# Patient Record
Sex: MALE | Race: WHITE | NOT HISPANIC OR LATINO | Employment: FULL TIME | URBAN - METROPOLITAN AREA
[De-identification: names, ages, dates, MRNs, and addresses within clinical notes are randomized per-mention and may not be internally consistent; named-entity substitution may affect disease eponyms.]

---

## 2017-01-26 ENCOUNTER — RESULTS ENCOUNTER (OUTPATIENT)
Dept: FAMILY MEDICINE CLINIC | Facility: CLINIC | Age: 52
End: 2017-01-26

## 2017-01-26 DIAGNOSIS — E03.9 HYPOTHYROIDISM: ICD-10-CM

## 2017-01-26 DIAGNOSIS — I10 ESSENTIAL HYPERTENSION: ICD-10-CM

## 2017-01-26 DIAGNOSIS — Z12.5 SCREENING FOR PROSTATE CANCER: ICD-10-CM

## 2017-01-26 DIAGNOSIS — E78.5 HYPERLIPIDEMIA: ICD-10-CM

## 2017-02-21 DIAGNOSIS — I10 ESSENTIAL HYPERTENSION: ICD-10-CM

## 2017-02-21 DIAGNOSIS — E03.9 HYPOTHYROIDISM: ICD-10-CM

## 2017-02-21 DIAGNOSIS — E78.5 HYPERLIPIDEMIA: ICD-10-CM

## 2017-02-21 RX ORDER — ATORVASTATIN CALCIUM 20 MG/1
TABLET, FILM COATED ORAL
Qty: 90 TABLET | Refills: 0 | OUTPATIENT
Start: 2017-02-21

## 2017-02-21 RX ORDER — LEVOTHYROXINE SODIUM 0.03 MG/1
TABLET ORAL
Qty: 90 TABLET | Refills: 0 | OUTPATIENT
Start: 2017-02-21

## 2017-02-21 RX ORDER — METOPROLOL SUCCINATE 50 MG/1
TABLET, EXTENDED RELEASE ORAL
Qty: 90 TABLET | Refills: 0 | OUTPATIENT
Start: 2017-02-21

## 2017-02-21 RX ORDER — RAMIPRIL 5 MG/1
CAPSULE ORAL
Qty: 90 CAPSULE | Refills: 0 | OUTPATIENT
Start: 2017-02-21

## 2017-04-06 LAB
ALBUMIN SERPL-MCNC: 4.1 G/DL (ref 3.5–5.2)
ALBUMIN/GLOB SERPL: 1.5 G/DL
ALP SERPL-CCNC: 88 U/L (ref 39–117)
ALT SERPL-CCNC: 32 U/L (ref 1–41)
APPEARANCE UR: CLEAR
AST SERPL-CCNC: 19 U/L (ref 1–40)
BACTERIA #/AREA URNS HPF: ABNORMAL /HPF
BASOPHILS # BLD AUTO: 0.02 10*3/MM3 (ref 0–0.2)
BASOPHILS NFR BLD AUTO: 0.4 % (ref 0–1.5)
BILIRUB SERPL-MCNC: 0.4 MG/DL (ref 0.1–1.2)
BILIRUB UR QL STRIP: NEGATIVE
BUN SERPL-MCNC: 7 MG/DL (ref 6–20)
BUN/CREAT SERPL: 7.4 (ref 7–25)
CALCIUM SERPL-MCNC: 9.3 MG/DL (ref 8.6–10.5)
CASTS URNS MICRO: ABNORMAL
CHLORIDE SERPL-SCNC: 101 MMOL/L (ref 98–107)
CHOLEST SERPL-MCNC: 197 MG/DL (ref 0–200)
CO2 SERPL-SCNC: 22.7 MMOL/L (ref 22–29)
COLOR UR: (no result)
CREAT SERPL-MCNC: 0.94 MG/DL (ref 0.76–1.27)
EOSINOPHIL # BLD AUTO: 0.07 10*3/MM3 (ref 0–0.7)
EOSINOPHIL NFR BLD AUTO: 1.3 % (ref 0.3–6.2)
EPI CELLS #/AREA URNS HPF: ABNORMAL /HPF
ERYTHROCYTE [DISTWIDTH] IN BLOOD BY AUTOMATED COUNT: 14.7 % (ref 11.5–14.5)
GLOBULIN SER CALC-MCNC: 2.8 GM/DL
GLUCOSE SERPL-MCNC: 80 MG/DL (ref 65–99)
GLUCOSE UR QL: NEGATIVE
HCT VFR BLD AUTO: 43.7 % (ref 40.4–52.2)
HDLC SERPL-MCNC: 43 MG/DL (ref 40–60)
HGB BLD-MCNC: 15.2 G/DL (ref 13.7–17.6)
HGB UR QL STRIP: NEGATIVE
IMM GRANULOCYTES # BLD: 0 10*3/MM3 (ref 0–0.03)
IMM GRANULOCYTES NFR BLD: 0 % (ref 0–0.5)
KETONES UR QL STRIP: NEGATIVE
LDLC SERPL CALC-MCNC: 134 MG/DL (ref 0–100)
LEUKOCYTE ESTERASE UR QL STRIP: NEGATIVE
LYMPHOCYTES # BLD AUTO: 1.81 10*3/MM3 (ref 0.9–4.8)
LYMPHOCYTES NFR BLD AUTO: 33.6 % (ref 19.6–45.3)
MCH RBC QN AUTO: 30.4 PG (ref 27–32.7)
MCHC RBC AUTO-ENTMCNC: 34.8 G/DL (ref 32.6–36.4)
MCV RBC AUTO: 87.4 FL (ref 79.8–96.2)
MONOCYTES # BLD AUTO: 0.42 10*3/MM3 (ref 0.2–1.2)
MONOCYTES NFR BLD AUTO: 7.8 % (ref 5–12)
MUCOUS THREADS URNS QL MICRO: ABNORMAL /HPF
NEUTROPHILS # BLD AUTO: 3.06 10*3/MM3 (ref 1.9–8.1)
NEUTROPHILS NFR BLD AUTO: 56.9 % (ref 42.7–76)
NITRITE UR QL STRIP: NEGATIVE
PH UR STRIP: 6 [PH] (ref 5–8)
PLATELET # BLD AUTO: 225 10*3/MM3 (ref 140–500)
POTASSIUM SERPL-SCNC: 3.8 MMOL/L (ref 3.5–5.2)
PROT SERPL-MCNC: 6.9 G/DL (ref 6–8.5)
PROT UR QL STRIP: NEGATIVE
PSA SERPL-MCNC: 0.66 NG/ML (ref 0–4)
RBC # BLD AUTO: 5 10*6/MM3 (ref 4.6–6)
RBC #/AREA URNS HPF: ABNORMAL /HPF
SODIUM SERPL-SCNC: 141 MMOL/L (ref 136–145)
SP GR UR: 1.02 (ref 1–1.03)
TRIGL SERPL-MCNC: 99 MG/DL (ref 0–150)
TSH SERPL DL<=0.005 MIU/L-ACNC: 2.03 MIU/ML (ref 0.27–4.2)
UROBILINOGEN UR STRIP-MCNC: (no result) MG/DL
VLDLC SERPL CALC-MCNC: 19.8 MG/DL (ref 5–40)
WBC # BLD AUTO: 5.38 10*3/MM3 (ref 4.5–10.7)
WBC #/AREA URNS HPF: ABNORMAL /HPF

## 2017-04-07 ENCOUNTER — OFFICE VISIT (OUTPATIENT)
Dept: FAMILY MEDICINE CLINIC | Facility: CLINIC | Age: 52
End: 2017-04-07

## 2017-04-07 VITALS
SYSTOLIC BLOOD PRESSURE: 120 MMHG | TEMPERATURE: 97.3 F | BODY MASS INDEX: 39.8 KG/M2 | DIASTOLIC BLOOD PRESSURE: 76 MMHG | RESPIRATION RATE: 16 BRPM | WEIGHT: 278 LBS | HEIGHT: 70 IN | HEART RATE: 115 BPM | OXYGEN SATURATION: 100 %

## 2017-04-07 DIAGNOSIS — N52.9 ERECTILE DYSFUNCTION, UNSPECIFIED ERECTILE DYSFUNCTION TYPE: ICD-10-CM

## 2017-04-07 DIAGNOSIS — I10 ESSENTIAL HYPERTENSION: ICD-10-CM

## 2017-04-07 DIAGNOSIS — E03.9 HYPOTHYROIDISM, UNSPECIFIED TYPE: ICD-10-CM

## 2017-04-07 DIAGNOSIS — E78.2 MIXED HYPERLIPIDEMIA: ICD-10-CM

## 2017-04-07 DIAGNOSIS — Z00.00 ANNUAL PHYSICAL EXAM: Primary | ICD-10-CM

## 2017-04-07 PROCEDURE — 93000 ELECTROCARDIOGRAM COMPLETE: CPT | Performed by: FAMILY MEDICINE

## 2017-04-07 PROCEDURE — 99396 PREV VISIT EST AGE 40-64: CPT | Performed by: FAMILY MEDICINE

## 2017-04-07 PROCEDURE — 71020 XR CHEST PA AND LATERAL: CPT | Performed by: FAMILY MEDICINE

## 2017-04-07 RX ORDER — ATORVASTATIN CALCIUM 20 MG/1
20 TABLET, FILM COATED ORAL DAILY
Qty: 90 TABLET | Refills: 1 | Status: SHIPPED | OUTPATIENT
Start: 2017-04-07 | End: 2017-10-13 | Stop reason: SDUPTHER

## 2017-04-07 RX ORDER — LEVOTHYROXINE SODIUM 0.03 MG/1
25 TABLET ORAL DAILY
Qty: 90 TABLET | Refills: 1 | Status: SHIPPED | OUTPATIENT
Start: 2017-04-07 | End: 2017-10-13 | Stop reason: SDUPTHER

## 2017-04-07 RX ORDER — SILDENAFIL CITRATE 100 MG
100 TABLET ORAL AS NEEDED
Qty: 6 TABLET | Refills: 5 | Status: SHIPPED | OUTPATIENT
Start: 2017-04-07 | End: 2018-06-25 | Stop reason: SDUPTHER

## 2017-04-07 RX ORDER — METOPROLOL SUCCINATE 50 MG/1
50 TABLET, EXTENDED RELEASE ORAL DAILY
Qty: 90 TABLET | Refills: 1 | Status: SHIPPED | OUTPATIENT
Start: 2017-04-07 | End: 2017-10-13 | Stop reason: SDUPTHER

## 2017-04-07 RX ORDER — ASPIRIN 325 MG
325 TABLET ORAL DAILY
COMMUNITY

## 2017-04-07 RX ORDER — DESVENLAFAXINE 100 MG/1
200 TABLET, EXTENDED RELEASE ORAL DAILY
COMMUNITY
Start: 2017-03-18

## 2017-04-07 RX ORDER — ARIPIPRAZOLE 30 MG/1
30 TABLET ORAL
COMMUNITY
Start: 2017-03-18

## 2017-04-07 RX ORDER — RAMIPRIL 5 MG/1
5 CAPSULE ORAL DAILY
Qty: 90 CAPSULE | Refills: 1 | Status: SHIPPED | OUTPATIENT
Start: 2017-04-07 | End: 2017-10-13 | Stop reason: SDUPTHER

## 2017-04-07 RX ORDER — GABAPENTIN 300 MG/1
300 CAPSULE ORAL DAILY
Qty: 90 CAPSULE | Refills: 1 | Status: SHIPPED | OUTPATIENT
Start: 2017-04-07 | End: 2018-04-13

## 2017-04-07 NOTE — PROGRESS NOTES
Procedure     ECG 12 Lead  Date/Time: 4/7/2017 11:37 AM  Performed by: LUIS ANTONIO LEWIS  Authorized by: LUIS ANTONIO LEWIS   Comparison: compared with previous ECG from 3/10/2016  Similar to previous ECG  Rhythm: sinus rhythm  Rate: normal  BPM: 111  ST Segments: ST segments normal  T Waves: T waves normal  QRS axis: normal  Other: no other findings  Clinical impression: normal ECG

## 2017-09-18 RX ORDER — GABAPENTIN 300 MG/1
CAPSULE ORAL
Qty: 90 CAPSULE | Refills: 1 | OUTPATIENT
Start: 2017-09-18

## 2017-10-04 DIAGNOSIS — E03.9 HYPOTHYROIDISM, UNSPECIFIED TYPE: ICD-10-CM

## 2017-10-04 DIAGNOSIS — E78.2 MIXED HYPERLIPIDEMIA: ICD-10-CM

## 2017-10-04 DIAGNOSIS — I10 ESSENTIAL HYPERTENSION: ICD-10-CM

## 2017-10-04 RX ORDER — METOPROLOL SUCCINATE 50 MG/1
TABLET, EXTENDED RELEASE ORAL
Qty: 90 TABLET | Refills: 1 | OUTPATIENT
Start: 2017-10-04

## 2017-10-04 RX ORDER — ATORVASTATIN CALCIUM 20 MG/1
TABLET, FILM COATED ORAL
Qty: 90 TABLET | Refills: 1 | OUTPATIENT
Start: 2017-10-04

## 2017-10-04 RX ORDER — LEVOTHYROXINE SODIUM 0.03 MG/1
TABLET ORAL
Qty: 90 TABLET | Refills: 1 | OUTPATIENT
Start: 2017-10-04

## 2017-10-04 RX ORDER — RAMIPRIL 5 MG/1
CAPSULE ORAL
Qty: 90 CAPSULE | Refills: 1 | OUTPATIENT
Start: 2017-10-04

## 2017-10-12 LAB
ALBUMIN SERPL-MCNC: 4.4 G/DL (ref 3.5–5.2)
ALBUMIN/GLOB SERPL: 1.5 G/DL
ALP SERPL-CCNC: 92 U/L (ref 39–117)
ALT SERPL-CCNC: 27 U/L (ref 1–41)
APPEARANCE UR: CLEAR
AST SERPL-CCNC: 20 U/L (ref 1–40)
BACTERIA #/AREA URNS HPF: NORMAL /HPF
BASOPHILS # BLD AUTO: 0.01 10*3/MM3 (ref 0–0.2)
BASOPHILS NFR BLD AUTO: 0.1 % (ref 0–1.5)
BILIRUB SERPL-MCNC: 0.6 MG/DL (ref 0.1–1.2)
BILIRUB UR QL STRIP: NEGATIVE
BUN SERPL-MCNC: 10 MG/DL (ref 6–20)
BUN/CREAT SERPL: 9.4 (ref 7–25)
CALCIUM SERPL-MCNC: 9.3 MG/DL (ref 8.6–10.5)
CASTS URNS MICRO: NORMAL
CHLORIDE SERPL-SCNC: 99 MMOL/L (ref 98–107)
CHOLEST SERPL-MCNC: 128 MG/DL (ref 0–200)
CO2 SERPL-SCNC: 25.3 MMOL/L (ref 22–29)
COLOR UR: YELLOW
CREAT SERPL-MCNC: 1.06 MG/DL (ref 0.76–1.27)
EOSINOPHIL # BLD AUTO: 0.15 10*3/MM3 (ref 0–0.7)
EOSINOPHIL NFR BLD AUTO: 2.2 % (ref 0.3–6.2)
EPI CELLS #/AREA URNS HPF: NORMAL /HPF
ERYTHROCYTE [DISTWIDTH] IN BLOOD BY AUTOMATED COUNT: 14.5 % (ref 11.5–14.5)
GLOBULIN SER CALC-MCNC: 2.9 GM/DL
GLUCOSE SERPL-MCNC: 81 MG/DL (ref 65–99)
GLUCOSE UR QL: NEGATIVE
HCT VFR BLD AUTO: 43.5 % (ref 40.4–52.2)
HDLC SERPL-MCNC: 40 MG/DL (ref 40–60)
HGB BLD-MCNC: 14.8 G/DL (ref 13.7–17.6)
HGB UR QL STRIP: NEGATIVE
IMM GRANULOCYTES # BLD: 0.02 10*3/MM3 (ref 0–0.03)
IMM GRANULOCYTES NFR BLD: 0.3 % (ref 0–0.5)
KETONES UR QL STRIP: NEGATIVE
LDLC SERPL CALC-MCNC: 71 MG/DL (ref 0–100)
LEUKOCYTE ESTERASE UR QL STRIP: NEGATIVE
LYMPHOCYTES # BLD AUTO: 2.36 10*3/MM3 (ref 0.9–4.8)
LYMPHOCYTES NFR BLD AUTO: 34.7 % (ref 19.6–45.3)
MCH RBC QN AUTO: 29.9 PG (ref 27–32.7)
MCHC RBC AUTO-ENTMCNC: 34 G/DL (ref 32.6–36.4)
MCV RBC AUTO: 87.9 FL (ref 79.8–96.2)
MONOCYTES # BLD AUTO: 0.52 10*3/MM3 (ref 0.2–1.2)
MONOCYTES NFR BLD AUTO: 7.6 % (ref 5–12)
NEUTROPHILS # BLD AUTO: 3.74 10*3/MM3 (ref 1.9–8.1)
NEUTROPHILS NFR BLD AUTO: 55.1 % (ref 42.7–76)
NITRITE UR QL STRIP: NEGATIVE
PH UR STRIP: 6.5 [PH] (ref 5–8)
PLATELET # BLD AUTO: 248 10*3/MM3 (ref 140–500)
POTASSIUM SERPL-SCNC: 4.2 MMOL/L (ref 3.5–5.2)
PROT SERPL-MCNC: 7.3 G/DL (ref 6–8.5)
PROT UR QL STRIP: NEGATIVE
RBC # BLD AUTO: 4.95 10*6/MM3 (ref 4.6–6)
RBC #/AREA URNS HPF: NORMAL /HPF
SODIUM SERPL-SCNC: 139 MMOL/L (ref 136–145)
SP GR UR: 1.02 (ref 1–1.03)
TRIGL SERPL-MCNC: 83 MG/DL (ref 0–150)
UROBILINOGEN UR STRIP-MCNC: (no result) MG/DL
VLDLC SERPL CALC-MCNC: 16.6 MG/DL (ref 5–40)
WBC # BLD AUTO: 6.8 10*3/MM3 (ref 4.5–10.7)
WBC #/AREA URNS HPF: NORMAL /HPF

## 2017-10-13 ENCOUNTER — OFFICE VISIT (OUTPATIENT)
Dept: FAMILY MEDICINE CLINIC | Facility: CLINIC | Age: 52
End: 2017-10-13

## 2017-10-13 VITALS
WEIGHT: 272 LBS | SYSTOLIC BLOOD PRESSURE: 118 MMHG | OXYGEN SATURATION: 100 % | DIASTOLIC BLOOD PRESSURE: 72 MMHG | HEART RATE: 72 BPM | TEMPERATURE: 98.5 F | BODY MASS INDEX: 38.94 KG/M2 | RESPIRATION RATE: 16 BRPM | HEIGHT: 70 IN

## 2017-10-13 DIAGNOSIS — I10 ESSENTIAL HYPERTENSION: ICD-10-CM

## 2017-10-13 DIAGNOSIS — E03.9 HYPOTHYROIDISM, UNSPECIFIED TYPE: ICD-10-CM

## 2017-10-13 DIAGNOSIS — E78.2 MIXED HYPERLIPIDEMIA: ICD-10-CM

## 2017-10-13 PROCEDURE — 99214 OFFICE O/P EST MOD 30 MIN: CPT | Performed by: FAMILY MEDICINE

## 2017-10-13 RX ORDER — RAMIPRIL 5 MG/1
5 CAPSULE ORAL DAILY
Qty: 90 CAPSULE | Refills: 1 | Status: SHIPPED | OUTPATIENT
Start: 2017-10-13 | End: 2018-04-13 | Stop reason: SDUPTHER

## 2017-10-13 RX ORDER — ATORVASTATIN CALCIUM 20 MG/1
20 TABLET, FILM COATED ORAL DAILY
Qty: 90 TABLET | Refills: 1 | Status: SHIPPED | OUTPATIENT
Start: 2017-10-13 | End: 2018-04-13 | Stop reason: SDUPTHER

## 2017-10-13 RX ORDER — METOPROLOL SUCCINATE 50 MG/1
50 TABLET, EXTENDED RELEASE ORAL DAILY
Qty: 90 TABLET | Refills: 1 | Status: SHIPPED | OUTPATIENT
Start: 2017-10-13 | End: 2018-04-13 | Stop reason: SDUPTHER

## 2017-10-13 RX ORDER — LEVOTHYROXINE SODIUM 0.03 MG/1
25 TABLET ORAL DAILY
Qty: 90 TABLET | Refills: 1 | Status: SHIPPED | OUTPATIENT
Start: 2017-10-13 | End: 2018-04-13 | Stop reason: SDUPTHER

## 2017-10-13 NOTE — PROGRESS NOTES
Subjective   Ricardo Tomlinson is a 51 y.o. male.     History of Present Illness     The following portions of the patient's history were reviewed and updated as appropriate: allergies, current medications, past family history, past medical history, past social history, past surgical history and problem list.    Review of Systems   Constitutional: Negative for activity change, appetite change and unexpected weight change.   Eyes: Negative for visual disturbance.   Respiratory: Negative for chest tightness and shortness of breath.    Cardiovascular: Negative for chest pain and palpitations.   Skin: Negative for color change.   Neurological: Negative for syncope and speech difficulty.   Psychiatric/Behavioral: Negative for confusion and decreased concentration.       Objective   Physical Exam   Constitutional: He is oriented to person, place, and time. He appears well-developed and well-nourished.   HENT:   Head: Atraumatic.   Mouth/Throat: Oropharynx is clear and moist.   Eyes: EOM are normal. Pupils are equal, round, and reactive to light.   Neck: Normal range of motion. Neck supple. No thyromegaly present.   Cardiovascular: Normal rate and regular rhythm.    No murmur heard.  Pulmonary/Chest: Effort normal and breath sounds normal.   Abdominal: Soft.   Musculoskeletal: Normal range of motion.   Neurological: He is alert and oriented to person, place, and time.   Skin: Skin is warm and dry.   Psychiatric: He has a normal mood and affect. His behavior is normal.   Nursing note and vitals reviewed.      Assessment/Plan   Ricardo was seen today for hypertension and follow-up.    Diagnoses and all orders for this visit:    Mixed hyperlipidemia  -     atorvastatin (LIPITOR) 20 MG tablet; Take 1 tablet by mouth Daily.  -     Comprehensive metabolic panel; Future  -     Lipid panel; Future  -     CBC and Differential; Future  -     TSH; Future  -     PSA; Future    Essential hypertension  -     ramipril (ALTACE) 5 MG  capsule; Take 1 capsule by mouth Daily.  -     metoprolol succinate XL (TOPROL-XL) 50 MG 24 hr tablet; Take 1 tablet by mouth Daily.  -     Comprehensive metabolic panel; Future  -     Lipid panel; Future  -     CBC and Differential; Future  -     TSH; Future  -     PSA; Future    Hypothyroidism, unspecified type  -     levothyroxine (SYNTHROID, LEVOTHROID) 25 MCG tablet; Take 1 tablet by mouth Daily.  -     Comprehensive metabolic panel; Future  -     Lipid panel; Future  -     CBC and Differential; Future  -     TSH; Future  -     PSA; Future                Chief Complaint:   Chief Complaint   Patient presents with   • Hypertension   • Follow-up     6 month       Ricardo Tomlinson 51 y.o. male who presents today for Medical Management of the below listed issues and medication refills. Reviewed labs, refill meds.  he has a problem list of   Patient Active Problem List   Diagnosis   • Hyperlipidemia   • Essential hypertension   • ADHD (attention deficit hyperactivity disorder)   • Depression   • CAD (coronary artery disease)   • Nicotine dependence   • Hypothyroidism   • Erectile dysfunction   .  Since the last visit, he has overall felt well.  he has been compliant with   Current Outpatient Prescriptions:   •  amphetamine-dextroamphetamine (ADDERALL) 30 MG tablet, Take 30 mg by mouth daily., Disp: , Rfl:   •  amphetamine-dextroamphetamine XR (ADDERALL XR) 30 MG 24 hr capsule, Take 30 mg by mouth 2 (two) times a day., Disp: , Rfl:   •  ARIPiprazole (ABILIFY) 30 MG tablet, Take 30 mg by mouth every night at bedtime., Disp: , Rfl:   •  aspirin 325 MG tablet, Take 325 mg by mouth Daily., Disp: , Rfl:   •  atorvastatin (LIPITOR) 20 MG tablet, Take 1 tablet by mouth Daily., Disp: 90 tablet, Rfl: 1  •  desvenlafaxine (PRISTIQ) 100 MG 24 hr tablet, Take 200 mg by mouth Daily., Disp: , Rfl:   •  gabapentin (NEURONTIN) 300 MG capsule, Take 1 capsule by mouth Daily., Disp: 90 capsule, Rfl: 1  •  levothyroxine (SYNTHROID,  "LEVOTHROID) 25 MCG tablet, Take 1 tablet by mouth Daily., Disp: 90 tablet, Rfl: 1  •  metoprolol succinate XL (TOPROL-XL) 50 MG 24 hr tablet, Take 1 tablet by mouth Daily., Disp: 90 tablet, Rfl: 1  •  ramipril (ALTACE) 5 MG capsule, Take 1 capsule by mouth Daily., Disp: 90 capsule, Rfl: 1  •  VIAGRA 100 MG tablet, Take 1 tablet by mouth As Needed (ED)., Disp: 6 tablet, Rfl: 5.  he denies medication side effects.    All of the chronic condition(s) listed above are stable w/o issues.    /72 (BP Location: Left arm, Patient Position: Sitting, Cuff Size: Large Adult)  Pulse 72  Temp 98.5 °F (36.9 °C) (Oral)   Resp 16  Ht 70\" (177.8 cm)  Wt 272 lb (123 kg)  SpO2 100%  BMI 39.03 kg/m2    Results for orders placed or performed in visit on 10/11/17   Comprehensive Metabolic Panel   Result Value Ref Range    Glucose 81 65 - 99 mg/dL    BUN 10 6 - 20 mg/dL    Creatinine 1.06 0.76 - 1.27 mg/dL    eGFR Non African Am 73 >60 mL/min/1.73    eGFR African Am 89 >60 mL/min/1.73    BUN/Creatinine Ratio 9.4 7.0 - 25.0    Sodium 139 136 - 145 mmol/L    Potassium 4.2 3.5 - 5.2 mmol/L    Chloride 99 98 - 107 mmol/L    Total CO2 25.3 22.0 - 29.0 mmol/L    Calcium 9.3 8.6 - 10.5 mg/dL    Total Protein 7.3 6.0 - 8.5 g/dL    Albumin 4.40 3.50 - 5.20 g/dL    Globulin 2.9 gm/dL    A/G Ratio 1.5 g/dL    Total Bilirubin 0.6 0.1 - 1.2 mg/dL    Alkaline Phosphatase 92 39 - 117 U/L    AST (SGOT) 20 1 - 40 U/L    ALT (SGPT) 27 1 - 41 U/L   Microscopic Examination   Result Value Ref Range    WBC, UA 0-2 /hpf    RBC, UA 0-2 /hpf    Epithelial Cells (non renal) 0-2 /hpf    Cast Type Comment     Bacteria, UA Comment None Seen /hpf   Lipid Panel   Result Value Ref Range    Total Cholesterol 128 0 - 200 mg/dL    Triglycerides 83 0 - 150 mg/dL    HDL Cholesterol 40 40 - 60 mg/dL    VLDL Cholesterol 16.6 5 - 40 mg/dL    LDL Cholesterol  71 0 - 100 mg/dL   CBC & Differential   Result Value Ref Range    WBC 6.80 4.50 - 10.70 10*3/mm3    RBC 4.95 " 4.60 - 6.00 10*6/mm3    Hemoglobin 14.8 13.7 - 17.6 g/dL    Hematocrit 43.5 40.4 - 52.2 %    MCV 87.9 79.8 - 96.2 fL    MCH 29.9 27.0 - 32.7 pg    MCHC 34.0 32.6 - 36.4 g/dL    RDW 14.5 11.5 - 14.5 %    Platelets 248 140 - 500 10*3/mm3    Neutrophil Rel % 55.1 42.7 - 76.0 %    Lymphocyte Rel % 34.7 19.6 - 45.3 %    Monocyte Rel % 7.6 5.0 - 12.0 %    Eosinophil Rel % 2.2 0.3 - 6.2 %    Basophil Rel % 0.1 0.0 - 1.5 %    Neutrophils Absolute 3.74 1.90 - 8.10 10*3/mm3    Lymphocytes Absolute 2.36 0.90 - 4.80 10*3/mm3    Monocytes Absolute 0.52 0.20 - 1.20 10*3/mm3    Eosinophils Absolute 0.15 0.00 - 0.70 10*3/mm3    Basophils Absolute 0.01 0.00 - 0.20 10*3/mm3    Immature Granulocyte Rel % 0.3 0.0 - 0.5 %    Immature Grans Absolute 0.02 0.00 - 0.03 10*3/mm3   Urinalysis With Microscopic   Result Value Ref Range    Specific Gravity, UA 1.018 1.005 - 1.030    pH, UA 6.5 5.0 - 8.0    Color, UA Yellow     Appearance, UA Clear Clear    Leukocytes, UA Negative Negative    Protein Negative Negative    Glucose, UA Negative Negative    Ketones Negative Negative    Blood, UA Negative Negative    Bilirubin, UA Negative Negative    Urobilinogen, UA Comment     Nitrite, UA Negative Negative

## 2018-03-02 DIAGNOSIS — Z12.11 COLON CANCER SCREENING: Primary | ICD-10-CM

## 2018-03-02 RX ORDER — SODIUM CHLORIDE, SODIUM LACTATE, POTASSIUM CHLORIDE, CALCIUM CHLORIDE 600; 310; 30; 20 MG/100ML; MG/100ML; MG/100ML; MG/100ML
30 INJECTION, SOLUTION INTRAVENOUS CONTINUOUS
Status: CANCELLED | OUTPATIENT
Start: 2018-04-09

## 2018-03-13 ENCOUNTER — RESULTS ENCOUNTER (OUTPATIENT)
Dept: FAMILY MEDICINE CLINIC | Facility: CLINIC | Age: 53
End: 2018-03-13

## 2018-03-13 DIAGNOSIS — I10 ESSENTIAL HYPERTENSION: ICD-10-CM

## 2018-03-13 DIAGNOSIS — E78.2 MIXED HYPERLIPIDEMIA: ICD-10-CM

## 2018-03-13 DIAGNOSIS — E03.9 HYPOTHYROIDISM, UNSPECIFIED TYPE: ICD-10-CM

## 2018-03-16 PROBLEM — Z12.11 COLON CANCER SCREENING: Status: ACTIVE | Noted: 2018-03-16

## 2018-04-09 ENCOUNTER — ANESTHESIA EVENT (OUTPATIENT)
Dept: GASTROENTEROLOGY | Facility: HOSPITAL | Age: 53
End: 2018-04-09

## 2018-04-09 ENCOUNTER — ANESTHESIA (OUTPATIENT)
Dept: GASTROENTEROLOGY | Facility: HOSPITAL | Age: 53
End: 2018-04-09

## 2018-04-09 ENCOUNTER — HOSPITAL ENCOUNTER (OUTPATIENT)
Facility: HOSPITAL | Age: 53
Setting detail: HOSPITAL OUTPATIENT SURGERY
Discharge: HOME | End: 2018-04-09
Attending: INTERNAL MEDICINE | Admitting: INTERNAL MEDICINE

## 2018-04-09 VITALS
OXYGEN SATURATION: 97 % | TEMPERATURE: 97.9 F | HEIGHT: 70 IN | RESPIRATION RATE: 18 BRPM | SYSTOLIC BLOOD PRESSURE: 122 MMHG | WEIGHT: 266 LBS | HEART RATE: 75 BPM | DIASTOLIC BLOOD PRESSURE: 93 MMHG | BODY MASS INDEX: 38.08 KG/M2

## 2018-04-09 DIAGNOSIS — Z12.11 COLON CANCER SCREENING: ICD-10-CM

## 2018-04-09 PROCEDURE — 25010000002 PROPOFOL 10 MG/ML EMULSION: Performed by: NURSE ANESTHETIST, CERTIFIED REGISTERED

## 2018-04-09 PROCEDURE — 45381 COLONOSCOPY SUBMUCOUS NJX: CPT | Performed by: INTERNAL MEDICINE

## 2018-04-09 PROCEDURE — 45385 COLONOSCOPY W/LESION REMOVAL: CPT | Performed by: INTERNAL MEDICINE

## 2018-04-09 PROCEDURE — 88305 TISSUE EXAM BY PATHOLOGIST: CPT | Performed by: INTERNAL MEDICINE

## 2018-04-09 PROCEDURE — S0260 H&P FOR SURGERY: HCPCS | Performed by: INTERNAL MEDICINE

## 2018-04-09 PROCEDURE — 25010000002 PHENYLEPHRINE PER 1 ML: Performed by: NURSE ANESTHETIST, CERTIFIED REGISTERED

## 2018-04-09 DEVICE — DEV CLIP ENDO RESOLUTION360 CONTRL ROT 235CM: Type: IMPLANTABLE DEVICE | Site: TRANSVERSE COLON | Status: FUNCTIONAL

## 2018-04-09 RX ORDER — SODIUM CHLORIDE 0.9 % (FLUSH) 0.9 %
1-10 SYRINGE (ML) INJECTION AS NEEDED
Status: DISCONTINUED | OUTPATIENT
Start: 2018-04-09 | End: 2018-04-09 | Stop reason: HOSPADM

## 2018-04-09 RX ORDER — SODIUM CHLORIDE, SODIUM LACTATE, POTASSIUM CHLORIDE, CALCIUM CHLORIDE 600; 310; 30; 20 MG/100ML; MG/100ML; MG/100ML; MG/100ML
30 INJECTION, SOLUTION INTRAVENOUS CONTINUOUS
Status: DISCONTINUED | OUTPATIENT
Start: 2018-04-09 | End: 2018-04-09

## 2018-04-09 RX ORDER — SODIUM CHLORIDE, SODIUM LACTATE, POTASSIUM CHLORIDE, CALCIUM CHLORIDE 600; 310; 30; 20 MG/100ML; MG/100ML; MG/100ML; MG/100ML
30 INJECTION, SOLUTION INTRAVENOUS CONTINUOUS PRN
Status: DISCONTINUED | OUTPATIENT
Start: 2018-04-09 | End: 2018-04-09 | Stop reason: HOSPADM

## 2018-04-09 RX ORDER — PROPOFOL 10 MG/ML
VIAL (ML) INTRAVENOUS CONTINUOUS PRN
Status: DISCONTINUED | OUTPATIENT
Start: 2018-04-09 | End: 2018-04-09 | Stop reason: SURG

## 2018-04-09 RX ORDER — LIDOCAINE HYDROCHLORIDE 20 MG/ML
INJECTION, SOLUTION INFILTRATION; PERINEURAL AS NEEDED
Status: DISCONTINUED | OUTPATIENT
Start: 2018-04-09 | End: 2018-04-09 | Stop reason: SURG

## 2018-04-09 RX ORDER — PROPOFOL 10 MG/ML
VIAL (ML) INTRAVENOUS AS NEEDED
Status: DISCONTINUED | OUTPATIENT
Start: 2018-04-09 | End: 2018-04-09 | Stop reason: SURG

## 2018-04-09 RX ADMIN — PHENYLEPHRINE HYDROCHLORIDE 100 MCG: 10 INJECTION INTRAVENOUS at 14:21

## 2018-04-09 RX ADMIN — SODIUM CHLORIDE, POTASSIUM CHLORIDE, SODIUM LACTATE AND CALCIUM CHLORIDE 30 ML/HR: 600; 310; 30; 20 INJECTION, SOLUTION INTRAVENOUS at 12:40

## 2018-04-09 RX ADMIN — PROPOFOL 100 MG: 10 INJECTION, EMULSION INTRAVENOUS at 13:08

## 2018-04-09 RX ADMIN — PROPOFOL 160 MCG/KG/MIN: 10 INJECTION, EMULSION INTRAVENOUS at 13:08

## 2018-04-09 RX ADMIN — LIDOCAINE HYDROCHLORIDE 80 MG: 20 INJECTION, SOLUTION INFILTRATION; PERINEURAL at 13:07

## 2018-04-09 NOTE — H&P
[facility]  Pre Procedure History & Physical    Chief Complaint:  Colon cancer screening, family history of colon cancer      HPI: 53yo M with PMH as below here for screening colonoscopy.  Reports his mother had colon cancer at age 76.  Denies blood in stool, change in bowel habits, abdominal pain.  Otherwise feels well.        Past Medical History:   Past Medical History:   Diagnosis Date   • ADHD (attention deficit hyperactivity disorder)    • CAD (coronary artery disease)    • Depression    • H/O complete eye exam due   • Hyperlipidemia    • Hypertension    • Tobacco abuse        Family History:  Family History   Problem Relation Age of Onset   • Colon cancer Mother    • Hypertension Father    • Heart disease Father    • Alcohol abuse Sister        Social History:   reports that he has quit smoking. He has never used smokeless tobacco. He reports that he drinks alcohol. He reports that he does not use drugs.    Medications:   Prescriptions Prior to Admission   Medication Sig Dispense Refill Last Dose   • amphetamine-dextroamphetamine (ADDERALL) 30 MG tablet Take 30 mg by mouth daily.   Taking   • amphetamine-dextroamphetamine XR (ADDERALL XR) 30 MG 24 hr capsule Take 30 mg by mouth 2 (two) times a day.   Taking   • ARIPiprazole (ABILIFY) 30 MG tablet Take 30 mg by mouth every night at bedtime.   Taking   • aspirin 325 MG tablet Take 325 mg by mouth Daily.   Taking   • atorvastatin (LIPITOR) 20 MG tablet Take 1 tablet by mouth Daily. 90 tablet 1    • desvenlafaxine (PRISTIQ) 100 MG 24 hr tablet Take 200 mg by mouth Daily.   Taking   • gabapentin (NEURONTIN) 300 MG capsule Take 1 capsule by mouth Daily. 90 capsule 1 Taking   • levothyroxine (SYNTHROID, LEVOTHROID) 25 MCG tablet Take 1 tablet by mouth Daily. 90 tablet 1    • metoprolol succinate XL (TOPROL-XL) 50 MG 24 hr tablet Take 1 tablet by mouth Daily. 90 tablet 1    • ramipril (ALTACE) 5 MG capsule Take 1 capsule by mouth Daily. 90  capsule 1    • VIAGRA 100 MG tablet Take 1 tablet by mouth As Needed (ED). 6 tablet 5 Taking       Allergies:  Codeine and Latex    ROS:    Pertinent items are noted in HPI     Objective     There were no vitals taken for this visit.    Physical Exam   Constitutional: Pt is oriented to person, place, and time and well-developed, well-nourished, and in no distress.   HENT:   Mouth/Throat: Oropharynx is clear and moist.   Neck: Normal range of motion. Neck supple.   Cardiovascular: Normal rate, regular rhythm and normal heart sounds.    Pulmonary/Chest: Effort normal and breath sounds normal. No respiratory distress. No  wheezes.   Abdominal: Soft. Bowel sounds are normal.   Skin: Skin is warm and dry.   Psychiatric: Mood, memory, affect and judgment normal.     Assessment/Plan     Diagnosis:  Colon cancer screening, family history of colon cancer      Anticipated Surgical Procedure:    Colonoscopy    The risks, benefits, and alternatives of this procedure have been discussed with the patient or the responsible party- the patient understands and agrees to proceed.

## 2018-04-09 NOTE — ANESTHESIA POSTPROCEDURE EVALUATION
Patient: Ricardo Tomlinson    Procedure Summary     Date:  04/09/18 Room / Location:   [facility] /  [facility]    Anesthesia Start:  1301 Anesthesia Stop:  1426    Procedure:  COLONOSCOPY  TO CECUM/TI WITH POLYPECTOMY ( HOT /COLDSNARE):SALINE LIFT; RESOLUTION CLIP X3 (N/A ) Diagnosis:       Colon cancer screening      (Colon cancer screening [Z12.11])    Surgeon:  Roxane Rodríguez MD Provider:  Frandy Lutz MD    Anesthesia Type:  MAC ASA Status:  2          Anesthesia Type: MAC  Last vitals  BP   122/93 (04/09/18 1446)   Temp   36.6 °C (97.9 °F) (04/09/18 1446)   Pulse   75 (04/09/18 1446)   Resp   18 (04/09/18 1446)     SpO2   97 % (04/09/18 1446)     Post Anesthesia Care and Evaluation    Patient location during evaluation: bedside  Patient participation: complete - patient participated  Level of consciousness: awake and alert  Pain score: 0  Pain management: adequate  Airway patency: patent  Anesthetic complications: No anesthetic complications  PONV Status: none  Cardiovascular status: acceptable  Respiratory status: acceptable  Hydration status: acceptable  Post Neuraxial Block status: Motor and sensory function returned to baseline

## 2018-04-09 NOTE — ANESTHESIA PREPROCEDURE EVALUATION
Anesthesia Evaluation     Patient summary reviewed                Airway   Mallampati: III  TM distance: >3 FB  Neck ROM: full  No difficulty expected  Dental    (+) edentulous    Pulmonary    Cardiovascular   Exercise tolerance: good (4-7 METS)    Rhythm: regular  Rate: normal        Neuro/Psych  GI/Hepatic/Renal/Endo      Musculoskeletal     Abdominal    Substance History      OB/GYN          Other                        Anesthesia Plan    ASA 2     MAC     intravenous induction   Anesthetic plan and risks discussed with patient.

## 2018-04-10 LAB
CYTO UR: NORMAL
LAB AP CASE REPORT: NORMAL
LAB AP INTRADEPARTMENTAL CONSULT: NORMAL
Lab: NORMAL
PATH REPORT.FINAL DX SPEC: NORMAL
PATH REPORT.GROSS SPEC: NORMAL

## 2018-04-10 NOTE — PROGRESS NOTES
The polyps removed from his colon were a mix of tubular adenomas, tubulovillous adenomas, and a hyperplastic polyp.    Next colonoscopy should be in one year-- pls place in recall, thkedar

## 2018-04-11 ENCOUNTER — TELEPHONE (OUTPATIENT)
Dept: GASTROENTEROLOGY | Facility: CLINIC | Age: 53
End: 2018-04-11

## 2018-04-11 DIAGNOSIS — I10 ESSENTIAL HYPERTENSION: ICD-10-CM

## 2018-04-11 DIAGNOSIS — E78.2 MIXED HYPERLIPIDEMIA: ICD-10-CM

## 2018-04-11 DIAGNOSIS — E03.9 HYPOTHYROIDISM, UNSPECIFIED TYPE: ICD-10-CM

## 2018-04-11 NOTE — TELEPHONE ENCOUNTER
----- Message from Roxane Rodríguez MD sent at 4/10/2018  1:18 PM EDT -----  The polyps removed from his colon were a mix of tubular adenomas, tubulovillous adenomas, and a hyperplastic polyp.    Next colonoscopy should be in one year-- pls place in [facility]

## 2018-04-11 NOTE — TELEPHONE ENCOUNTER
Call to pt.  Advise per Dr Rodríguez that the polyps removed from colon were a mix of tubular adenomas, tubulouvillous adenomas, and a  hyerplastic polyp.    Next c/s should be in 1 yr.    Advise that all 1st degree family members, including children, need screening c/s starting at age 40 due to type of polyp that pt had (tubulovillous).  Pt verb understanding.    C/s for 4/9/19 placed in recall.

## 2018-04-11 NOTE — TELEPHONE ENCOUNTER
----- Message from Roxane Rodríguez MD sent at 4/11/2018 12:49 PM EDT -----  Can we pls make sure to let him know (can't remember if I put this in the results note) that all 1st degree family members, including children, need screening colonoscopy starting at age 40 due to the type of polyps that he had (tubulovillous)  thanks

## 2018-04-12 LAB
ALBUMIN SERPL-MCNC: 4.5 G/DL (ref 3.5–5.2)
ALBUMIN/GLOB SERPL: 1.7 G/DL
ALP SERPL-CCNC: 88 U/L (ref 39–117)
ALT SERPL-CCNC: 29 U/L (ref 1–41)
AST SERPL-CCNC: 22 U/L (ref 1–40)
BASOPHILS # BLD AUTO: 0.01 10*3/MM3 (ref 0–0.2)
BASOPHILS NFR BLD AUTO: 0.1 % (ref 0–1.5)
BILIRUB SERPL-MCNC: 0.8 MG/DL (ref 0.1–1.2)
BUN SERPL-MCNC: 8 MG/DL (ref 6–20)
BUN/CREAT SERPL: 7.8 (ref 7–25)
CALCIUM SERPL-MCNC: 9.6 MG/DL (ref 8.6–10.5)
CHLORIDE SERPL-SCNC: 101 MMOL/L (ref 98–107)
CHOLEST SERPL-MCNC: 122 MG/DL (ref 0–200)
CO2 SERPL-SCNC: 26.7 MMOL/L (ref 22–29)
CREAT SERPL-MCNC: 1.03 MG/DL (ref 0.76–1.27)
EOSINOPHIL # BLD AUTO: 0.08 10*3/MM3 (ref 0–0.7)
EOSINOPHIL NFR BLD AUTO: 1.2 % (ref 0.3–6.2)
ERYTHROCYTE [DISTWIDTH] IN BLOOD BY AUTOMATED COUNT: 15 % (ref 11.5–14.5)
GFR SERPLBLD CREATININE-BSD FMLA CKD-EPI: 76 ML/MIN/1.73
GFR SERPLBLD CREATININE-BSD FMLA CKD-EPI: 92 ML/MIN/1.73
GLOBULIN SER CALC-MCNC: 2.6 GM/DL
GLUCOSE SERPL-MCNC: 86 MG/DL (ref 65–99)
HCT VFR BLD AUTO: 44.7 % (ref 40.4–52.2)
HDLC SERPL-MCNC: 43 MG/DL (ref 40–60)
HGB BLD-MCNC: 14.8 G/DL (ref 13.7–17.6)
IMM GRANULOCYTES # BLD: 0.02 10*3/MM3 (ref 0–0.03)
IMM GRANULOCYTES NFR BLD: 0.3 % (ref 0–0.5)
LDLC SERPL CALC-MCNC: 63 MG/DL (ref 0–100)
LYMPHOCYTES # BLD AUTO: 2.29 10*3/MM3 (ref 0.9–4.8)
LYMPHOCYTES NFR BLD AUTO: 33.4 % (ref 19.6–45.3)
MCH RBC QN AUTO: 29.7 PG (ref 27–32.7)
MCHC RBC AUTO-ENTMCNC: 33.1 G/DL (ref 32.6–36.4)
MCV RBC AUTO: 89.6 FL (ref 79.8–96.2)
MONOCYTES # BLD AUTO: 0.51 10*3/MM3 (ref 0.2–1.2)
MONOCYTES NFR BLD AUTO: 7.4 % (ref 5–12)
NEUTROPHILS # BLD AUTO: 3.94 10*3/MM3 (ref 1.9–8.1)
NEUTROPHILS NFR BLD AUTO: 57.6 % (ref 42.7–76)
PLATELET # BLD AUTO: 237 10*3/MM3 (ref 140–500)
POTASSIUM SERPL-SCNC: 4.3 MMOL/L (ref 3.5–5.2)
PROT SERPL-MCNC: 7.1 G/DL (ref 6–8.5)
PSA SERPL-MCNC: 0.83 NG/ML (ref 0–4)
RBC # BLD AUTO: 4.99 10*6/MM3 (ref 4.6–6)
SODIUM SERPL-SCNC: 144 MMOL/L (ref 136–145)
TRIGL SERPL-MCNC: 80 MG/DL (ref 0–150)
TSH SERPL DL<=0.005 MIU/L-ACNC: 2.29 MIU/ML (ref 0.27–4.2)
VLDLC SERPL CALC-MCNC: 16 MG/DL (ref 5–40)
WBC # BLD AUTO: 6.85 10*3/MM3 (ref 4.5–10.7)

## 2018-04-12 RX ORDER — METOPROLOL SUCCINATE 50 MG/1
TABLET, EXTENDED RELEASE ORAL
Qty: 90 TABLET | Refills: 1 | OUTPATIENT
Start: 2018-04-12

## 2018-04-12 RX ORDER — RAMIPRIL 5 MG/1
CAPSULE ORAL
Qty: 90 CAPSULE | Refills: 1 | OUTPATIENT
Start: 2018-04-12

## 2018-04-12 RX ORDER — LEVOTHYROXINE SODIUM 0.03 MG/1
TABLET ORAL
Qty: 90 TABLET | Refills: 1 | OUTPATIENT
Start: 2018-04-12

## 2018-04-12 RX ORDER — ATORVASTATIN CALCIUM 20 MG/1
TABLET, FILM COATED ORAL
Qty: 90 TABLET | Refills: 1 | OUTPATIENT
Start: 2018-04-12

## 2018-04-13 ENCOUNTER — OFFICE VISIT (OUTPATIENT)
Dept: FAMILY MEDICINE CLINIC | Facility: CLINIC | Age: 53
End: 2018-04-13

## 2018-04-13 ENCOUNTER — RESULTS ENCOUNTER (OUTPATIENT)
Dept: FAMILY MEDICINE CLINIC | Facility: CLINIC | Age: 53
End: 2018-04-13

## 2018-04-13 VITALS
WEIGHT: 270 LBS | SYSTOLIC BLOOD PRESSURE: 124 MMHG | BODY MASS INDEX: 38.65 KG/M2 | HEART RATE: 84 BPM | OXYGEN SATURATION: 97 % | DIASTOLIC BLOOD PRESSURE: 73 MMHG | TEMPERATURE: 97 F | RESPIRATION RATE: 16 BRPM | HEIGHT: 70 IN

## 2018-04-13 DIAGNOSIS — E78.2 MIXED HYPERLIPIDEMIA: Primary | ICD-10-CM

## 2018-04-13 DIAGNOSIS — I10 ESSENTIAL HYPERTENSION: ICD-10-CM

## 2018-04-13 DIAGNOSIS — E78.2 MIXED HYPERLIPIDEMIA: ICD-10-CM

## 2018-04-13 DIAGNOSIS — Z00.00 ANNUAL PHYSICAL EXAM: ICD-10-CM

## 2018-04-13 DIAGNOSIS — E03.9 HYPOTHYROIDISM, UNSPECIFIED TYPE: ICD-10-CM

## 2018-04-13 PROCEDURE — 99396 PREV VISIT EST AGE 40-64: CPT | Performed by: FAMILY MEDICINE

## 2018-04-13 PROCEDURE — 71046 X-RAY EXAM CHEST 2 VIEWS: CPT | Performed by: FAMILY MEDICINE

## 2018-04-13 RX ORDER — METOPROLOL SUCCINATE 50 MG/1
50 TABLET, EXTENDED RELEASE ORAL DAILY
Qty: 90 TABLET | Refills: 1 | Status: SHIPPED | OUTPATIENT
Start: 2018-04-13 | End: 2018-10-15 | Stop reason: SDUPTHER

## 2018-04-13 RX ORDER — ATORVASTATIN CALCIUM 20 MG/1
20 TABLET, FILM COATED ORAL DAILY
Qty: 90 TABLET | Refills: 1 | Status: SHIPPED | OUTPATIENT
Start: 2018-04-13 | End: 2018-10-15 | Stop reason: SDUPTHER

## 2018-04-13 RX ORDER — RAMIPRIL 5 MG/1
5 CAPSULE ORAL DAILY
Qty: 90 CAPSULE | Refills: 1 | Status: SHIPPED | OUTPATIENT
Start: 2018-04-13 | End: 2018-10-15 | Stop reason: SDUPTHER

## 2018-04-13 RX ORDER — LEVOTHYROXINE SODIUM 0.03 MG/1
25 TABLET ORAL DAILY
Qty: 90 TABLET | Refills: 1 | Status: SHIPPED | OUTPATIENT
Start: 2018-04-13 | End: 2018-10-15 | Stop reason: SDUPTHER

## 2018-04-13 NOTE — PATIENT INSTRUCTIONS
Exercise 30 minutes most days of the week  Sleep 6-8 hours each night if possible  Low fat, low cholesterol diet   we discussed prescribed medications and how to take them   make sure you get results of any labs/studies ordered today  Low glycemic index diet     See me 6 months

## 2018-04-13 NOTE — PROGRESS NOTES
Subjective   Ricardo Tomlinson is a 52 y.o. male.     History of Present Illness   Chief Complaint:   Chief Complaint   Patient presents with   • Annual Exam       Ricardo Tomlinson 52 y.o. male who presents today for his annual physical exam with Medical Management of the below listed issues and medication refills. He declined an EKG in the office today. He had a CXR in the office today. Was smoking 1ppd cigarettes  And stopped 4 months ago. Some sob on fast walking.     No chest pain. He refused ekg. See cxr.  Had 1st  Colonoscopy  And had polyps and some were precancerous and he needs repeat ccolonoscopy in 1 year.   he has a problem list of   Patient Active Problem List   Diagnosis   • Hyperlipidemia   • Essential hypertension   • ADHD (attention deficit hyperactivity disorder)   • Depression   • CAD (coronary artery disease)   • Nicotine dependence   • Hypothyroidism   • Erectile dysfunction   • Colon cancer screening   .  Since the last visit, he has overall felt well.  he has been compliant with   Current Outpatient Prescriptions:   •  amphetamine-dextroamphetamine (ADDERALL) 30 MG tablet, Take 30 mg by mouth daily., Disp: , Rfl:   •  amphetamine-dextroamphetamine XR (ADDERALL XR) 30 MG 24 hr capsule, Take 30 mg by mouth 2 (two) times a day., Disp: , Rfl:   •  ARIPiprazole (ABILIFY) 30 MG tablet, Take 30 mg by mouth every night at bedtime., Disp: , Rfl:   •  aspirin 325 MG tablet, Take 325 mg by mouth Daily., Disp: , Rfl:   •  atorvastatin (LIPITOR) 20 MG tablet, Take 1 tablet by mouth Daily., Disp: 90 tablet, Rfl: 1  •  desvenlafaxine (PRISTIQ) 100 MG 24 hr tablet, Take 200 mg by mouth Daily., Disp: , Rfl:   •  levothyroxine (SYNTHROID, LEVOTHROID) 25 MCG tablet, Take 1 tablet by mouth Daily., Disp: 90 tablet, Rfl: 1  •  metoprolol succinate XL (TOPROL-XL) 50 MG 24 hr tablet, Take 1 tablet by mouth Daily., Disp: 90 tablet, Rfl: 1  •  ramipril (ALTACE) 5 MG capsule, Take 1 capsule by mouth Daily., Disp: 90  "capsule, Rfl: 1  •  VIAGRA 100 MG tablet, Take 1 tablet by mouth As Needed (ED)., Disp: 6 tablet, Rfl: 5.  he denies medication side effects.    All of the chronic condition(s) listed above are stable w/o issues.    /73   Pulse 84   Temp 97 °F (36.1 °C) (Oral)   Resp 16   Ht 177.8 cm (70\")   Wt 122 kg (270 lb)   SpO2 97%   BMI 38.74 kg/m²     The following portions of the patient's history were reviewed and updated as appropriate: allergies, current medications, past family history, past medical history, past social history, past surgical history and problem list.    Review of Systems   Constitutional: Negative for activity change, appetite change and unexpected weight change.   Eyes: Negative for visual disturbance.   Respiratory: Negative for chest tightness and shortness of breath.    Cardiovascular: Negative for chest pain and palpitations.   Genitourinary: Positive for decreased urine volume and difficulty urinating.   Skin: Negative for color change.   Neurological: Negative for tremors, syncope and speech difficulty.   Psychiatric/Behavioral: Negative for behavioral problems, confusion and decreased concentration.       Objective   Physical Exam   Constitutional: He is oriented to person, place, and time. He appears well-developed and well-nourished.   HENT:   Head: Normocephalic and atraumatic.   Right Ear: External ear normal.   Left Ear: External ear normal.   Mouth/Throat: Oropharynx is clear and moist.   Eyes: Conjunctivae and EOM are normal. Pupils are equal, round, and reactive to light.   Neck: Normal range of motion. Neck supple.   Cardiovascular: Normal rate and regular rhythm.    No murmur heard.  Pulmonary/Chest: Effort normal and breath sounds normal. No respiratory distress. He has no wheezes.   Abdominal: Soft. Bowel sounds are normal. There is no tenderness.   Genitourinary: Prostate normal.   Musculoskeletal: Normal range of motion. He exhibits no edema, tenderness or deformity. "   Lymphadenopathy:     He has no cervical adenopathy.   Neurological: He is alert and oriented to person, place, and time. He has normal reflexes.   Skin: Skin is warm and dry. No rash noted. No erythema.   Psychiatric: He has a normal mood and affect. His behavior is normal. Judgment and thought content normal.   Nursing note and vitals reviewed.    Views: lateral and posterior-anterior    Relevant Clinical Issues/Diagnoses/Indications for XRay: see HPI  Clinical Findings: Chest: was negative for infiltrate, effusion, pneumothorax, or wide mediastinum    Compared with previous XRay? yes    Date of Previous Xray:April 7, 2017    Changes on current Xray? no    Assessment/Plan   Ricardo was seen today for annual exam.    Diagnoses and all orders for this visit:    Mixed hyperlipidemia  -     atorvastatin (LIPITOR) 20 MG tablet; Take 1 tablet by mouth Daily.  -     Comprehensive Metabolic Panel; Future  -     Lipid Panel; Future    Annual physical exam  -     XR Chest PA & Lateral  -     Comprehensive Metabolic Panel; Future  -     Lipid Panel; Future    Hypothyroidism, unspecified type  -     levothyroxine (SYNTHROID, LEVOTHROID) 25 MCG tablet; Take 1 tablet by mouth Daily.  -     Comprehensive Metabolic Panel; Future  -     Lipid Panel; Future    Essential hypertension  -     metoprolol succinate XL (TOPROL-XL) 50 MG 24 hr tablet; Take 1 tablet by mouth Daily.  -     ramipril (ALTACE) 5 MG capsule; Take 1 capsule by mouth Daily.  -     Comprehensive Metabolic Panel; Future  -     Lipid Panel; Future

## 2018-06-25 DIAGNOSIS — N52.9 ERECTILE DYSFUNCTION, UNSPECIFIED ERECTILE DYSFUNCTION TYPE: ICD-10-CM

## 2018-06-27 RX ORDER — SILDENAFIL CITRATE 100 MG
TABLET ORAL
Qty: 6 TABLET | Refills: 5 | Status: SHIPPED | OUTPATIENT
Start: 2018-06-27 | End: 2019-04-15 | Stop reason: SDUPTHER

## 2018-10-10 DIAGNOSIS — E78.2 MIXED HYPERLIPIDEMIA: ICD-10-CM

## 2018-10-10 DIAGNOSIS — E03.9 HYPOTHYROIDISM, UNSPECIFIED TYPE: ICD-10-CM

## 2018-10-10 DIAGNOSIS — I10 ESSENTIAL HYPERTENSION: ICD-10-CM

## 2018-10-11 LAB
ALBUMIN SERPL-MCNC: 4 G/DL (ref 3.5–5.2)
ALBUMIN/GLOB SERPL: 1.3 G/DL
ALP SERPL-CCNC: 92 U/L (ref 39–117)
ALT SERPL-CCNC: 26 U/L (ref 1–41)
AST SERPL-CCNC: 18 U/L (ref 1–40)
BILIRUB SERPL-MCNC: 0.4 MG/DL (ref 0.1–1.2)
BUN SERPL-MCNC: 10 MG/DL (ref 6–20)
BUN/CREAT SERPL: 10.2 (ref 7–25)
CALCIUM SERPL-MCNC: 9.3 MG/DL (ref 8.6–10.5)
CHLORIDE SERPL-SCNC: 101 MMOL/L (ref 98–107)
CHOLEST SERPL-MCNC: 134 MG/DL (ref 0–200)
CO2 SERPL-SCNC: 26.9 MMOL/L (ref 22–29)
CREAT SERPL-MCNC: 0.98 MG/DL (ref 0.76–1.27)
GLOBULIN SER CALC-MCNC: 3.1 GM/DL
GLUCOSE SERPL-MCNC: 81 MG/DL (ref 65–99)
HDLC SERPL-MCNC: 44 MG/DL (ref 40–60)
LDLC SERPL CALC-MCNC: 74 MG/DL (ref 0–100)
POTASSIUM SERPL-SCNC: 4.1 MMOL/L (ref 3.5–5.2)
PROT SERPL-MCNC: 7.1 G/DL (ref 6–8.5)
SODIUM SERPL-SCNC: 138 MMOL/L (ref 136–145)
TRIGL SERPL-MCNC: 78 MG/DL (ref 0–150)
VLDLC SERPL CALC-MCNC: 15.6 MG/DL (ref 5–40)

## 2018-10-11 RX ORDER — ATORVASTATIN CALCIUM 20 MG/1
TABLET, FILM COATED ORAL
Qty: 90 TABLET | Refills: 1 | OUTPATIENT
Start: 2018-10-11

## 2018-10-11 RX ORDER — RAMIPRIL 5 MG/1
CAPSULE ORAL
Qty: 90 CAPSULE | Refills: 1 | OUTPATIENT
Start: 2018-10-11

## 2018-10-11 RX ORDER — METOPROLOL SUCCINATE 50 MG/1
TABLET, EXTENDED RELEASE ORAL
Qty: 90 TABLET | Refills: 1 | OUTPATIENT
Start: 2018-10-11

## 2018-10-11 RX ORDER — LEVOTHYROXINE SODIUM 0.03 MG/1
TABLET ORAL
Qty: 90 TABLET | Refills: 1 | OUTPATIENT
Start: 2018-10-11

## 2018-10-15 ENCOUNTER — OFFICE VISIT (OUTPATIENT)
Dept: FAMILY MEDICINE CLINIC | Facility: CLINIC | Age: 53
End: 2018-10-15

## 2018-10-15 VITALS
SYSTOLIC BLOOD PRESSURE: 118 MMHG | HEART RATE: 88 BPM | TEMPERATURE: 98.2 F | OXYGEN SATURATION: 98 % | DIASTOLIC BLOOD PRESSURE: 78 MMHG | HEIGHT: 70 IN | RESPIRATION RATE: 16 BRPM | WEIGHT: 278 LBS | BODY MASS INDEX: 39.8 KG/M2

## 2018-10-15 DIAGNOSIS — E78.2 MIXED HYPERLIPIDEMIA: Primary | ICD-10-CM

## 2018-10-15 DIAGNOSIS — I10 ESSENTIAL HYPERTENSION: ICD-10-CM

## 2018-10-15 DIAGNOSIS — E03.9 HYPOTHYROIDISM, UNSPECIFIED TYPE: ICD-10-CM

## 2018-10-15 PROCEDURE — 99214 OFFICE O/P EST MOD 30 MIN: CPT | Performed by: FAMILY MEDICINE

## 2018-10-15 RX ORDER — RAMIPRIL 5 MG/1
5 CAPSULE ORAL DAILY
Qty: 90 CAPSULE | Refills: 1 | Status: SHIPPED | OUTPATIENT
Start: 2018-10-15 | End: 2019-04-13 | Stop reason: SDUPTHER

## 2018-10-15 RX ORDER — METOPROLOL SUCCINATE 50 MG/1
50 TABLET, EXTENDED RELEASE ORAL DAILY
Qty: 90 TABLET | Refills: 1 | Status: SHIPPED | OUTPATIENT
Start: 2018-10-15 | End: 2019-04-13 | Stop reason: SDUPTHER

## 2018-10-15 RX ORDER — ATORVASTATIN CALCIUM 20 MG/1
20 TABLET, FILM COATED ORAL DAILY
Qty: 90 TABLET | Refills: 1 | Status: SHIPPED | OUTPATIENT
Start: 2018-10-15 | End: 2019-04-13 | Stop reason: SDUPTHER

## 2018-10-15 RX ORDER — TAMSULOSIN HYDROCHLORIDE 0.4 MG/1
CAPSULE ORAL
COMMUNITY
Start: 2018-09-24

## 2018-10-15 RX ORDER — LEVOTHYROXINE SODIUM 0.03 MG/1
25 TABLET ORAL DAILY
Qty: 90 TABLET | Refills: 1 | Status: SHIPPED | OUTPATIENT
Start: 2018-10-15 | End: 2019-04-13 | Stop reason: SDUPTHER

## 2018-10-15 NOTE — PROGRESS NOTES
Subjective   Chief Complaint:   Chief Complaint   Patient presents with   • Hyperlipidemia   • Hypertension   • Hypothyroidism         History of Present Illness patient is here today to refill meds and go over labs.  I reviewed the labs and they are all good.  Had a CMP and a lipid panel and both were normal.  Blood pressure 118/78.'s  Viagra versus side in a fill and he would prefer to use the Viagra so he is going to call his insurance [employer] [facility].  Discussed with them.             Ricardo Tomlinson 52 y.o. male who presents today for Medical Management of the below listed issues and medication refills.    ICD-10-CM ICD-9-CM   1. Mixed hyperlipidemia E78.2 272.2   2. Hypothyroidism, unspecified type E03.9 244.9   3. Essential hypertension I10 401.9        he has a problem list of   Patient Active Problem List   Diagnosis   • Hyperlipidemia   • Essential hypertension   • ADHD (attention deficit hyperactivity disorder)   • Depression   • CAD (coronary artery disease)   • Nicotine dependence   • Hypothyroidism   • Erectile dysfunction   • Colon cancer screening   .  Since the last visit, he has overall felt well.  he has been compliant with   Current Outpatient Prescriptions:   •  amphetamine-dextroamphetamine (ADDERALL) 30 MG tablet, Take 30 mg by mouth daily., Disp: , Rfl:   •  amphetamine-dextroamphetamine XR (ADDERALL XR) 30 MG 24 hr capsule, Take 30 mg by mouth 2 (two) times a day., Disp: , Rfl:   •  ARIPiprazole (ABILIFY) 30 MG tablet, Take 30 mg by mouth every night at bedtime., Disp: , Rfl:   •  aspirin 325 MG tablet, Take 325 mg by mouth Daily., Disp: , Rfl:   •  atorvastatin (LIPITOR) 20 MG tablet, Take 1 tablet by mouth Daily., Disp: 90 tablet, Rfl: 1  •  desvenlafaxine (PRISTIQ) 100 MG 24 hr tablet, Take 200 mg by mouth Daily., Disp: , Rfl:   •  levothyroxine (SYNTHROID, LEVOTHROID) 25 MCG tablet, Take 1 tablet by mouth Daily., Disp: 90 tablet, Rfl: 1  •  metoprolol succinate XL (TOPROL-XL) 50 MG 24  "hr tablet, Take 1 tablet by mouth Daily., Disp: 90 tablet, Rfl: 1  •  ramipril (ALTACE) 5 MG capsule, Take 1 capsule by mouth Daily., Disp: 90 capsule, Rfl: 1  •  tamsulosin (FLOMAX) 0.4 MG capsule 24 hr capsule, , Disp: , Rfl:   •  VIAGRA 100 MG tablet, TAKE 1 TABLET AS NEEDED FOR ERECTILE DYSFUNCTION, Disp: 6 tablet, Rfl: 5.  he denies medication side effects.    All of the chronic condition(s) listed above are stable w/o issues.    /78   Pulse 88   Temp 98.2 °F (36.8 °C)   Resp 16   Ht 177.8 cm (70\")   Wt 126 kg (278 lb)   SpO2 98%   BMI 39.89 kg/m²     Results for orders placed or performed in visit on 04/13/18   Comprehensive Metabolic Panel   Result Value Ref Range    Glucose 81 65 - 99 mg/dL    BUN 10 6 - 20 mg/dL    Creatinine 0.98 0.76 - 1.27 mg/dL    eGFR Non African Am 80 >60 mL/min/1.73    eGFR African Am 97 >60 mL/min/1.73    BUN/Creatinine Ratio 10.2 7.0 - 25.0    Sodium 138 136 - 145 mmol/L    Potassium 4.1 3.5 - 5.2 mmol/L    Chloride 101 98 - 107 mmol/L    Total CO2 26.9 22.0 - 29.0 mmol/L    Calcium 9.3 8.6 - 10.5 mg/dL    Total Protein 7.1 6.0 - 8.5 g/dL    Albumin 4.00 3.50 - 5.20 g/dL    Globulin 3.1 gm/dL    A/G Ratio 1.3 g/dL    Total Bilirubin 0.4 0.1 - 1.2 mg/dL    Alkaline Phosphatase 92 39 - 117 U/L    AST (SGOT) 18 1 - 40 U/L    ALT (SGPT) 26 1 - 41 U/L   Lipid Panel   Result Value Ref Range    Total Cholesterol 134 0 - 200 mg/dL    Triglycerides 78 0 - 150 mg/dL    HDL Cholesterol 44 40 - 60 mg/dL    VLDL Cholesterol 15.6 5 - 40 mg/dL    LDL Cholesterol  74 0 - 100 mg/dL             The following portions of the patient's history were reviewed and updated as appropriate: allergies, current medications, past family history, past medical history, past social history, past surgical history and problem list.    Review of Systems   Constitutional: Negative for activity change, appetite change and unexpected weight change.   Eyes: Negative for visual disturbance.   Respiratory: " Negative for chest tightness and shortness of breath.    Cardiovascular: Negative for chest pain and palpitations.   Skin: Negative for color change.   Neurological: Negative for syncope and speech difficulty.   Psychiatric/Behavioral: Negative for confusion and decreased concentration.       Objective   Physical Exam   Constitutional: He is oriented to person, place, and time. He appears well-developed and well-nourished.   HENT:   Head: Atraumatic.   Mouth/Throat: Oropharynx is clear and moist.   Eyes: Pupils are equal, round, and reactive to light. EOM are normal.   Neck: Normal range of motion. Neck supple. No thyromegaly present.   Cardiovascular: Normal rate and regular rhythm.    Pulmonary/Chest: Effort normal and breath sounds normal.   Abdominal: Soft.   Musculoskeletal: Normal range of motion.   Neurological: He is alert and oriented to person, place, and time.   Skin: Skin is warm and dry.   Psychiatric: He has a normal mood and affect. His behavior is normal.   Nursing note and vitals reviewed.      Assessment/Plan   Ricardo was seen today for hyperlipidemia, hypertension and hypothyroidism.    Diagnoses and all orders for this visit:    Mixed hyperlipidemia  -     atorvastatin (LIPITOR) 20 MG tablet; Take 1 tablet by mouth Daily.    Hypothyroidism, unspecified type  -     levothyroxine (SYNTHROID, LEVOTHROID) 25 MCG tablet; Take 1 tablet by mouth Daily.    Essential hypertension  -     metoprolol succinate XL (TOPROL-XL) 50 MG 24 hr tablet; Take 1 tablet by mouth Daily.  -     ramipril (ALTACE) 5 MG capsule; Take 1 capsule by mouth Daily.

## 2019-02-01 NOTE — PATIENT INSTRUCTIONS
Exercise 30 minutes most days of the week  Sleep 6-8 hours each night if possible  Low fat, low cholesterol diet   we discussed prescribed medications and how to take them   make sure you get results of any labs/studies ordered today  Low glycemic index diet        Patient calling to check status of this encounter.

## 2019-03-15 ENCOUNTER — RESULTS ENCOUNTER (OUTPATIENT)
Dept: FAMILY MEDICINE CLINIC | Facility: CLINIC | Age: 54
End: 2019-03-15

## 2019-03-15 DIAGNOSIS — E03.9 HYPOTHYROIDISM, UNSPECIFIED TYPE: ICD-10-CM

## 2019-03-15 DIAGNOSIS — E78.2 MIXED HYPERLIPIDEMIA: ICD-10-CM

## 2019-03-15 DIAGNOSIS — I10 ESSENTIAL HYPERTENSION: ICD-10-CM

## 2019-04-12 LAB
ALBUMIN SERPL-MCNC: 4.6 G/DL (ref 3.5–5.2)
ALBUMIN/GLOB SERPL: 1.8 G/DL
ALP SERPL-CCNC: 98 U/L (ref 39–117)
ALT SERPL-CCNC: 28 U/L (ref 1–41)
AST SERPL-CCNC: 23 U/L (ref 1–40)
BASOPHILS # BLD AUTO: 0.03 10*3/MM3 (ref 0–0.2)
BASOPHILS NFR BLD AUTO: 0.4 % (ref 0–1.5)
BILIRUB SERPL-MCNC: 0.9 MG/DL (ref 0.2–1.2)
BUN SERPL-MCNC: 7 MG/DL (ref 6–20)
BUN/CREAT SERPL: 6.1 (ref 7–25)
CALCIUM SERPL-MCNC: 9.4 MG/DL (ref 8.6–10.5)
CHLORIDE SERPL-SCNC: 99 MMOL/L (ref 98–107)
CHOLEST SERPL-MCNC: 125 MG/DL (ref 0–200)
CO2 SERPL-SCNC: 25.7 MMOL/L (ref 22–29)
CREAT SERPL-MCNC: 1.14 MG/DL (ref 0.76–1.27)
EOSINOPHIL # BLD AUTO: 0.11 10*3/MM3 (ref 0–0.4)
EOSINOPHIL NFR BLD AUTO: 1.4 % (ref 0.3–6.2)
ERYTHROCYTE [DISTWIDTH] IN BLOOD BY AUTOMATED COUNT: 14.6 % (ref 12.3–15.4)
GLOBULIN SER CALC-MCNC: 2.6 GM/DL
GLUCOSE SERPL-MCNC: 83 MG/DL (ref 65–99)
HCT VFR BLD AUTO: 44.3 % (ref 37.5–51)
HDLC SERPL-MCNC: 42 MG/DL (ref 40–60)
HGB BLD-MCNC: 14.7 G/DL (ref 13–17.7)
IMM GRANULOCYTES # BLD AUTO: 0.03 10*3/MM3 (ref 0–0.05)
IMM GRANULOCYTES NFR BLD AUTO: 0.4 % (ref 0–0.5)
LDLC SERPL CALC-MCNC: 68 MG/DL (ref 0–100)
LYMPHOCYTES # BLD AUTO: 2.25 10*3/MM3 (ref 0.7–3.1)
LYMPHOCYTES NFR BLD AUTO: 28 % (ref 19.6–45.3)
MCH RBC QN AUTO: 28.7 PG (ref 26.6–33)
MCHC RBC AUTO-ENTMCNC: 33.2 G/DL (ref 31.5–35.7)
MCV RBC AUTO: 86.5 FL (ref 79–97)
MONOCYTES # BLD AUTO: 0.56 10*3/MM3 (ref 0.1–0.9)
MONOCYTES NFR BLD AUTO: 7 % (ref 5–12)
NEUTROPHILS # BLD AUTO: 5.06 10*3/MM3 (ref 1.4–7)
NEUTROPHILS NFR BLD AUTO: 62.8 % (ref 42.7–76)
NRBC BLD AUTO-RTO: 0 /100 WBC (ref 0–0)
PLATELET # BLD AUTO: 253 10*3/MM3 (ref 140–450)
POTASSIUM SERPL-SCNC: 3.8 MMOL/L (ref 3.5–5.2)
PROT SERPL-MCNC: 7.2 G/DL (ref 6–8.5)
PSA SERPL-MCNC: 0.99 NG/ML (ref 0–4)
RBC # BLD AUTO: 5.12 10*6/MM3 (ref 4.14–5.8)
SODIUM SERPL-SCNC: 139 MMOL/L (ref 136–145)
TRIGL SERPL-MCNC: 76 MG/DL (ref 0–150)
TSH SERPL DL<=0.005 MIU/L-ACNC: 2.13 MIU/ML (ref 0.27–4.2)
VLDLC SERPL CALC-MCNC: 15.2 MG/DL
WBC # BLD AUTO: 8.04 10*3/MM3 (ref 3.4–10.8)

## 2019-04-13 DIAGNOSIS — E78.2 MIXED HYPERLIPIDEMIA: ICD-10-CM

## 2019-04-13 DIAGNOSIS — E03.9 HYPOTHYROIDISM, UNSPECIFIED TYPE: ICD-10-CM

## 2019-04-13 DIAGNOSIS — I10 ESSENTIAL HYPERTENSION: ICD-10-CM

## 2019-04-15 ENCOUNTER — OFFICE VISIT (OUTPATIENT)
Dept: FAMILY MEDICINE CLINIC | Facility: CLINIC | Age: 54
End: 2019-04-15

## 2019-04-15 VITALS
HEIGHT: 70 IN | SYSTOLIC BLOOD PRESSURE: 107 MMHG | RESPIRATION RATE: 16 BRPM | OXYGEN SATURATION: 99 % | TEMPERATURE: 97.8 F | WEIGHT: 283 LBS | HEART RATE: 91 BPM | BODY MASS INDEX: 40.52 KG/M2 | DIASTOLIC BLOOD PRESSURE: 72 MMHG

## 2019-04-15 DIAGNOSIS — I10 ESSENTIAL HYPERTENSION: ICD-10-CM

## 2019-04-15 DIAGNOSIS — N52.1 ERECTILE DYSFUNCTION DUE TO DISEASES CLASSIFIED ELSEWHERE: ICD-10-CM

## 2019-04-15 DIAGNOSIS — E78.2 MIXED HYPERLIPIDEMIA: ICD-10-CM

## 2019-04-15 DIAGNOSIS — I25.84 CORONARY ARTERY DISEASE DUE TO CALCIFIED CORONARY LESION: ICD-10-CM

## 2019-04-15 DIAGNOSIS — I25.10 CORONARY ARTERY DISEASE DUE TO CALCIFIED CORONARY LESION: ICD-10-CM

## 2019-04-15 DIAGNOSIS — R06.02 SHORTNESS OF BREATH: ICD-10-CM

## 2019-04-15 DIAGNOSIS — N52.9 ERECTILE DYSFUNCTION, UNSPECIFIED ERECTILE DYSFUNCTION TYPE: ICD-10-CM

## 2019-04-15 DIAGNOSIS — F32.9 MAJOR DEPRESSIVE DISORDER WITH CURRENT ACTIVE EPISODE, UNSPECIFIED DEPRESSION EPISODE SEVERITY, UNSPECIFIED WHETHER RECURRENT: ICD-10-CM

## 2019-04-15 DIAGNOSIS — Z12.11 COLON CANCER SCREENING: ICD-10-CM

## 2019-04-15 DIAGNOSIS — Z00.00 ANNUAL PHYSICAL EXAM: Primary | ICD-10-CM

## 2019-04-15 DIAGNOSIS — E03.9 HYPOTHYROIDISM, UNSPECIFIED TYPE: ICD-10-CM

## 2019-04-15 PROCEDURE — 71046 X-RAY EXAM CHEST 2 VIEWS: CPT | Performed by: FAMILY MEDICINE

## 2019-04-15 PROCEDURE — 93000 ELECTROCARDIOGRAM COMPLETE: CPT | Performed by: FAMILY MEDICINE

## 2019-04-15 PROCEDURE — 99396 PREV VISIT EST AGE 40-64: CPT | Performed by: FAMILY MEDICINE

## 2019-04-15 RX ORDER — SILDENAFIL CITRATE 100 MG
100 TABLET ORAL AS NEEDED
Qty: 6 TABLET | Refills: 5 | Status: SHIPPED | OUTPATIENT
Start: 2019-04-15

## 2019-04-15 NOTE — PROGRESS NOTES
Procedure     ECG 12 Lead  Date/Time: 4/15/2019 10:45 AM  Performed by: Walt West MD  Authorized by: Walt West MD   Comparison: compared with previous ECG from 4/7/2017  Similar to previous ECG  Rhythm: sinus rhythm  Rate: normal  BPM: 76  Conduction: conduction normal  ST Segments: ST segments normal  T Waves: T waves normal  QRS axis: normal  Other: no other findings    Clinical impression: normal ECG

## 2019-04-15 NOTE — PROGRESS NOTES
Subjective   Chief Complaint:   Chief Complaint   Patient presents with   • Annual Exam         History of Present Illness is here for complete physical exam he does have one complaint of shortness of breath.  Claims he is short of breath both at rest and with exertion.  Claims he is up 12 pounds since last year.  Claims he gets short of breath just walking out to get the mail.  NO  Chest pain with walking out to get the mail.  Swelling in his ankles.  EKG is normal.  I looked at his chest x-ray and also looks normal. Will get tmst, I was going to refer patient to dietitian but he did not want to see a dietitian.  So he can go on a diet just get rid of breads and sweets and Cokes and Pepsi's.  Says he drinks 3 Cokes a day.  Patient said he gets short of breath with 1 or 2 flights of steps.  It was 283 pounds.  Refill his meds.  Labs were reviewed and they are all normal including his PSA.  bmi  40.6.  Get tmst  Because of shortness of breath and dyspnea on exertion.  But no chest pain with exertion.  I am to get just a treadmill stress test to lose weight and I want to check you back after he gets a treadmill stress test.          Ricardo Tomlinson 53 y.o. male who presents today for Medical Management of the below listed issues and medication refills.    ICD-10-CM ICD-9-CM   1. Annual physical exam Z00.00 V70.0   2. Hypothyroidism, unspecified type E03.9 244.9   3. Mixed hyperlipidemia E78.2 272.2   4. Essential hypertension I10 401.9   5. Erectile dysfunction due to diseases classified elsewhere N52.1 607.84   6. Major depressive disorder with current active episode, unspecified depression episode severity, unspecified whether recurrent F32.9 296.30   7. Colon cancer screening Z12.11 V76.51   8. Coronary artery disease due to calcified coronary lesion I25.10 414.00    I25.84 414.4        he has a problem list of   Patient Active Problem List   Diagnosis   • Hyperlipidemia   • Essential hypertension   • ADHD  "(attention deficit hyperactivity disorder)   • Depression   • CAD (coronary artery disease)   • Nicotine dependence   • Hypothyroidism   • Erectile dysfunction   • Colon cancer screening   .  Since the last visit, he has overall felt well.  he has been compliant with   Current Outpatient Medications:   •  amphetamine-dextroamphetamine (ADDERALL) 30 MG tablet, Take 30 mg by mouth daily., Disp: , Rfl:   •  amphetamine-dextroamphetamine XR (ADDERALL XR) 30 MG 24 hr capsule, Take 30 mg by mouth 2 (two) times a day., Disp: , Rfl:   •  ARIPiprazole (ABILIFY) 30 MG tablet, Take 30 mg by mouth every night at bedtime., Disp: , Rfl:   •  aspirin 325 MG tablet, Take 325 mg by mouth Daily., Disp: , Rfl:   •  atorvastatin (LIPITOR) 20 MG tablet, Take 1 tablet by mouth Daily., Disp: 90 tablet, Rfl: 1  •  desvenlafaxine (PRISTIQ) 100 MG 24 hr tablet, Take 200 mg by mouth Daily., Disp: , Rfl:   •  levothyroxine (SYNTHROID, LEVOTHROID) 25 MCG tablet, Take 1 tablet by mouth Daily., Disp: 90 tablet, Rfl: 1  •  metoprolol succinate XL (TOPROL-XL) 50 MG 24 hr tablet, Take 1 tablet by mouth Daily., Disp: 90 tablet, Rfl: 1  •  ramipril (ALTACE) 5 MG capsule, Take 1 capsule by mouth Daily., Disp: 90 capsule, Rfl: 1  •  tamsulosin (FLOMAX) 0.4 MG capsule 24 hr capsule, , Disp: , Rfl:   •  VIAGRA 100 MG tablet, TAKE 1 TABLET AS NEEDED FOR ERECTILE DYSFUNCTION, Disp: 6 tablet, Rfl: 5.  he denies medication side effects.    All of the chronic condition(s) listed above are stable w/o issues.    /72   Pulse 91   Temp 97.8 °F (36.6 °C)   Resp 16   Ht 177.8 cm (70\")   Wt 128 kg (283 lb)   SpO2 99%   BMI 40.61 kg/m²     Results for orders placed or performed in visit on 03/15/19   Comprehensive metabolic panel   Result Value Ref Range    Glucose 83 65 - 99 mg/dL    BUN 7 6 - 20 mg/dL    Creatinine 1.14 0.76 - 1.27 mg/dL    eGFR Non African Am 67 >60 mL/min/1.73    eGFR African Am 81 >60 mL/min/1.73    BUN/Creatinine Ratio 6.1 (L) 7.0 - " 25.0    Sodium 139 136 - 145 mmol/L    Potassium 3.8 3.5 - 5.2 mmol/L    Chloride 99 98 - 107 mmol/L    Total CO2 25.7 22.0 - 29.0 mmol/L    Calcium 9.4 8.6 - 10.5 mg/dL    Total Protein 7.2 6.0 - 8.5 g/dL    Albumin 4.60 3.50 - 5.20 g/dL    Globulin 2.6 gm/dL    A/G Ratio 1.8 g/dL    Total Bilirubin 0.9 0.2 - 1.2 mg/dL    Alkaline Phosphatase 98 39 - 117 U/L    AST (SGOT) 23 1 - 40 U/L    ALT (SGPT) 28 1 - 41 U/L   Lipid panel   Result Value Ref Range    Total Cholesterol 125 0 - 200 mg/dL    Triglycerides 76 0 - 150 mg/dL    HDL Cholesterol 42 40 - 60 mg/dL    VLDL Cholesterol 15.2 mg/dL    LDL Cholesterol  68 0 - 100 mg/dL   TSH   Result Value Ref Range    TSH 2.130 0.270 - 4.200 mIU/mL   PSA   Result Value Ref Range    PSA 0.988 0.000 - 4.000 ng/mL   CBC and Differential   Result Value Ref Range    WBC 8.04 3.40 - 10.80 10*3/mm3    RBC 5.12 4.14 - 5.80 10*6/mm3    Hemoglobin 14.7 13.0 - 17.7 g/dL    Hematocrit 44.3 37.5 - 51.0 %    MCV 86.5 79.0 - 97.0 fL    MCH 28.7 26.6 - 33.0 pg    MCHC 33.2 31.5 - 35.7 g/dL    RDW 14.6 12.3 - 15.4 %    Platelets 253 140 - 450 10*3/mm3    Neutrophil Rel % 62.8 42.7 - 76.0 %    Lymphocyte Rel % 28.0 19.6 - 45.3 %    Monocyte Rel % 7.0 5.0 - 12.0 %    Eosinophil Rel % 1.4 0.3 - 6.2 %    Basophil Rel % 0.4 0.0 - 1.5 %    Neutrophils Absolute 5.06 1.40 - 7.00 10*3/mm3    Lymphocytes Absolute 2.25 0.70 - 3.10 10*3/mm3    Monocytes Absolute 0.56 0.10 - 0.90 10*3/mm3    Eosinophils Absolute 0.11 0.00 - 0.40 10*3/mm3    Basophils Absolute 0.03 0.00 - 0.20 10*3/mm3    Immature Granulocyte Rel % 0.4 0.0 - 0.5 %    Immature Grans Absolute 0.03 0.00 - 0.05 10*3/mm3    nRBC 0.0 0.0 - 0.0 /100 WBC             The following portions of the patient's history were reviewed and updated as appropriate: allergies, current medications, past family history, past medical history, past social history, past surgical history and problem list.    Review of Systems   Constitutional: Negative for  activity change, appetite change and unexpected weight change.   Eyes: Negative for visual disturbance.   Respiratory: Negative for chest tightness and shortness of breath.    Cardiovascular: Negative for chest pain and palpitations.   Skin: Negative for color change.   Neurological: Negative for syncope and speech difficulty.   Psychiatric/Behavioral: Negative for confusion and decreased concentration.       Objective   Physical Exam   Constitutional: He is oriented to person, place, and time. He appears well-developed and well-nourished.   HENT:   Head: Atraumatic.   Mouth/Throat: Oropharynx is clear and moist.   Eyes: EOM are normal. Pupils are equal, round, and reactive to light.   Neck: Normal range of motion. Neck supple. No thyromegaly present.   Cardiovascular: Normal rate, regular rhythm, normal heart sounds and intact distal pulses.   No murmur heard.  Pulmonary/Chest: Effort normal and breath sounds normal. He has no wheezes.   Abdominal: Soft. He exhibits no distension. There is no tenderness. There is no guarding.   Genitourinary:   Genitourinary Comments: State deferred he sees urologist   Musculoskeletal: Normal range of motion. He exhibits no edema or tenderness.   Lymphadenopathy:     He has no cervical adenopathy.   Neurological: He is alert and oriented to person, place, and time.   Skin: Skin is warm and dry.   Psychiatric: He has a normal mood and affect. His behavior is normal.   Nursing note and vitals reviewed.      Assessment/Plan   Ricardo was seen today for annual exam.    Diagnoses and all orders for this visit:    Annual physical exam  -     ECG 12 Lead    Hypothyroidism, unspecified type    Mixed hyperlipidemia    Essential hypertension    Erectile dysfunction due to diseases classified elsewhere    Major depressive disorder with current active episode, unspecified depression episode severity, unspecified whether recurrent    Colon cancer screening    Coronary artery disease due to  calcified coronary lesion  Comments:  fu  refer to cardiology.    Other orders  -     XR Chest PA & Lateral (In Office)

## 2019-04-16 RX ORDER — RAMIPRIL 5 MG/1
CAPSULE ORAL
Qty: 90 CAPSULE | Refills: 1 | Status: SHIPPED | OUTPATIENT
Start: 2019-04-16 | End: 2019-10-15 | Stop reason: SDUPTHER

## 2019-04-16 RX ORDER — ATORVASTATIN CALCIUM 20 MG/1
TABLET, FILM COATED ORAL
Qty: 90 TABLET | Refills: 1 | Status: SHIPPED | OUTPATIENT
Start: 2019-04-16 | End: 2019-10-15 | Stop reason: SDUPTHER

## 2019-04-16 RX ORDER — METOPROLOL SUCCINATE 50 MG/1
TABLET, EXTENDED RELEASE ORAL
Qty: 90 TABLET | Refills: 1 | Status: SHIPPED | OUTPATIENT
Start: 2019-04-16 | End: 2019-10-15 | Stop reason: SDUPTHER

## 2019-04-16 RX ORDER — LEVOTHYROXINE SODIUM 0.03 MG/1
TABLET ORAL
Qty: 90 TABLET | Refills: 1 | Status: SHIPPED | OUTPATIENT
Start: 2019-04-16 | End: 2019-10-15 | Stop reason: SDUPTHER

## 2019-05-03 ENCOUNTER — HOSPITAL ENCOUNTER (OUTPATIENT)
Dept: CARDIOLOGY | Facility: HOSPITAL | Age: 54
Discharge: HOME | End: 2019-05-03
Admitting: FAMILY MEDICINE

## 2019-05-03 LAB
BH CV STRESS BP STAGE 1: NORMAL
BH CV STRESS BP STAGE 2: NORMAL
BH CV STRESS DURATION MIN STAGE 1: 3
BH CV STRESS DURATION MIN STAGE 2: 2
BH CV STRESS DURATION SEC STAGE 1: 0
BH CV STRESS DURATION SEC STAGE 2: 0
BH CV STRESS GRADE STAGE 1: 10
BH CV STRESS GRADE STAGE 2: 12
BH CV STRESS HR STAGE 1: 162
BH CV STRESS HR STAGE 2: 171
BH CV STRESS METS STAGE 1: 5
BH CV STRESS METS STAGE 2: 7.5
BH CV STRESS PROTOCOL 1: NORMAL
BH CV STRESS RECOVERY BP: NORMAL MMHG
BH CV STRESS RECOVERY HR: 125 BPM
BH CV STRESS SPEED STAGE 1: 1.7
BH CV STRESS SPEED STAGE 2: 2.5
BH CV STRESS STAGE 1: 1
BH CV STRESS STAGE 2: 2
MAXIMAL PREDICTED HEART RATE: 166 BPM
PERCENT MAX PREDICTED HR: 103.01 %
STRESS BASELINE BP: NORMAL MMHG
STRESS BASELINE HR: 126 BPM
STRESS PERCENT HR: 121 %
STRESS POST ESTIMATED WORKLOAD: 6 METS
STRESS POST EXERCISE DUR MIN: 5 MIN
STRESS POST EXERCISE DUR SEC: 0 SEC
STRESS POST PEAK BP: NORMAL MMHG
STRESS POST PEAK HR: 171 BPM
STRESS TARGET HR: 141 BPM

## 2019-05-03 PROCEDURE — 93017 CV STRESS TEST TRACING ONLY: CPT

## 2019-05-03 PROCEDURE — 93018 CV STRESS TEST I&R ONLY: CPT | Performed by: INTERNAL MEDICINE

## 2019-05-03 PROCEDURE — 93016 CV STRESS TEST SUPVJ ONLY: CPT | Performed by: INTERNAL MEDICINE

## 2019-05-10 ENCOUNTER — OFFICE VISIT (OUTPATIENT)
Dept: CARDIOLOGY | Facility: CLINIC | Age: 54
End: 2019-05-10

## 2019-05-10 VITALS
BODY MASS INDEX: 39.65 KG/M2 | HEART RATE: 84 BPM | DIASTOLIC BLOOD PRESSURE: 88 MMHG | SYSTOLIC BLOOD PRESSURE: 120 MMHG | HEIGHT: 70 IN | OXYGEN SATURATION: 99 % | WEIGHT: 277 LBS

## 2019-05-10 DIAGNOSIS — I10 ESSENTIAL HYPERTENSION: ICD-10-CM

## 2019-05-10 DIAGNOSIS — I25.84 CORONARY ARTERY DISEASE DUE TO CALCIFIED CORONARY LESION: Primary | ICD-10-CM

## 2019-05-10 DIAGNOSIS — I25.10 CORONARY ARTERY DISEASE DUE TO CALCIFIED CORONARY LESION: Primary | ICD-10-CM

## 2019-05-10 PROCEDURE — 99204 OFFICE O/P NEW MOD 45 MIN: CPT | Performed by: INTERNAL MEDICINE

## 2019-05-16 NOTE — PROGRESS NOTES
Subjective:     Encounter Date:05/10/2019      Patient ID: Ricardo Tomlinson is a 53 y.o. male.    Chief Complaint:  Coronary Artery Disease   Presents for initial visit. The disease course has been stable. Symptoms include shortness of breath. Risk factors include decreased physical activity, hyperlipidemia and hypertension.   Hypertension   This is a chronic problem. The current episode started more than 1 year ago. The problem is controlled. Associated symptoms include shortness of breath.       53-year-old male who presents today for evaluation.  Patient had a stent placed in October 2003.  Patient presents today complaining of shortness of breath both at rest and with exertion.  Patient had calcification of his coronary artery disease.  Patient says that he has been progressively getting more more short of breath his really worsened this month.  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    Review of Systems   HENT: Positive for hearing loss.    Respiratory: Positive for shortness of breath.    Skin: Positive for itching.   Psychiatric/Behavioral: Positive for depression. The patient is nervous/anxious.    All other systems reviewed and are negative.      Procedures       Objective:     Physical Exam   Constitutional: He is oriented to person, place, and time. He appears well-developed.   HENT:   Head: Normocephalic.   Eyes: Conjunctivae are normal.   Neck: Normal range of motion.   Cardiovascular: Normal rate, regular rhythm and normal heart sounds.   Pulmonary/Chest: Breath sounds normal.   Abdominal: Soft. Bowel sounds are normal.   Musculoskeletal: Normal range of motion. He exhibits no edema.   Neurological: He is alert and oriented to person, place, and time.   Skin: Skin is warm and dry.   Psychiatric: He has a normal mood and affect. His behavior is normal.   Vitals reviewed.      Lab Review:       Assessment:          Diagnosis Plan   1. Coronary artery disease due to calcified  coronary lesion     2. Essential hypertension            Plan:         1.  Coronary artery disease.  Patient had a plain ECG done on 5/3/2019.  He should only walk 5 minutes but did not have any ECG changes.  I think unfortunately his shortness of breath is multifactorial including being out of shape as well as not wearing a CPAP for sleep apnea.  I will check an echocardiogram to make sure that he has no structural abnormalities.  2.  Hypertension blood pressure is pretty good today it did go up quite high at 190/70 with stress.  3.  I will set him up for an echo.  Follow-up in 3 months.

## 2019-06-07 ENCOUNTER — HOSPITAL ENCOUNTER (OUTPATIENT)
Dept: CARDIOLOGY | Facility: HOSPITAL | Age: 54
Discharge: HOME | End: 2019-06-07
Admitting: INTERNAL MEDICINE

## 2019-06-07 VITALS
WEIGHT: 273 LBS | HEIGHT: 70 IN | DIASTOLIC BLOOD PRESSURE: 60 MMHG | SYSTOLIC BLOOD PRESSURE: 102 MMHG | HEART RATE: 92 BPM | BODY MASS INDEX: 39.08 KG/M2

## 2019-06-07 DIAGNOSIS — I10 ESSENTIAL HYPERTENSION: ICD-10-CM

## 2019-06-07 DIAGNOSIS — I25.10 CORONARY ARTERY DISEASE DUE TO CALCIFIED CORONARY LESION: ICD-10-CM

## 2019-06-07 DIAGNOSIS — I25.84 CORONARY ARTERY DISEASE DUE TO CALCIFIED CORONARY LESION: ICD-10-CM

## 2019-06-07 LAB
AORTIC DIMENSIONLESS INDEX: 0.8 (DI)
AORTIC ROOT ANNULUS: 2.1 CM
BH CV ECHO MEAS - ACS: 1.9 CM
BH CV ECHO MEAS - AO MAX PG: 7 MMHG
BH CV ECHO MEAS - AO MEAN PG: 4 MMHG
BH CV ECHO MEAS - AO V2 MAX: 128 CM/SEC
BH CV ECHO MEAS - AO V2 VTI: 21 CM
BH CV ECHO MEAS - AVA(I,D): 2.6 CM2
BH CV ECHO MEAS - EDV(MOD-SP2): 63 ML
BH CV ECHO MEAS - EDV(MOD-SP4): 65 ML
BH CV ECHO MEAS - EF(MOD-BP): 66 %
BH CV ECHO MEAS - ESV(MOD-SP2): 22 ML
BH CV ECHO MEAS - ESV(MOD-SP4): 24 ML
BH CV ECHO MEAS - IVSD: 1.1 CM
BH CV ECHO MEAS - LAT PEAK E' VEL: 10 CM/SEC
BH CV ECHO MEAS - LV V1 MAX: 96 CM/SEC
BH CV ECHO MEAS - LV V1 VTI: 18 CM
BH CV ECHO MEAS - LVIDD: 5.3 CM
BH CV ECHO MEAS - LVIDS: 3.3 CM
BH CV ECHO MEAS - LVOT DIAM: 2.1 CM
BH CV ECHO MEAS - LVPWD: 1.2 CM
BH CV ECHO MEAS - MED PEAK E' VEL: 10 CM/SEC
BH CV ECHO MEAS - MV A DUR: 106 SEC
BH CV ECHO MEAS - MV A MAX VEL: 65 CM/SEC
BH CV ECHO MEAS - MV DEC TIME: 169 MSEC
BH CV ECHO MEAS - MV E MAX VEL: 75 CM/SEC
BH CV ECHO MEAS - MV E/A: 1.2
BH CV ECHO MEAS - MV MEAN PG: 1 MMHG
BH CV ECHO MEAS - MV P1/2T: 47 MSEC
BH CV ECHO MEAS - MV V2 VTI: 18 CM
BH CV ECHO MEAS - PULM A REVS DUR: 95 SEC
BH CV ECHO MEAS - PULM A REVS VEL: 31 CM/SEC
BH CV ECHO MEAS - PULM DIAS VEL: 37 CM/SEC
BH CV ECHO MEAS - PULM S/D: 1.4
BH CV ECHO MEAS - PULM SYS VEL: 51 CM/SEC
BH CV ECHO MEAS - RV V1 MAX: 100 CM/SEC
BH CV ECHO MEAS - RV V1 VTI: 16 CM
BH CV ECHO MEAS - RVOT DIAM: 2.1 CM
BH CV ECHO MEAS - TAPSE (>1.6): 2.9 CM2
BH CV ECHO MEASUREMENTS AVERAGE E/E' RATIO: 7.5
BH CV XLRA - RV BASE: 2.6 CM
BH CV XLRA - TDI S': 14 CM/SEC
LEFT ATRIUM VOLUME INDEX: 10 ML/M2
MAXIMAL PREDICTED HEART RATE: 166 BPM
STRESS TARGET HR: 141 BPM

## 2019-06-07 PROCEDURE — 93306 TTE W/DOPPLER COMPLETE: CPT | Performed by: INTERNAL MEDICINE

## 2019-06-07 PROCEDURE — 93306 TTE W/DOPPLER COMPLETE: CPT

## 2019-06-11 ENCOUNTER — TELEPHONE (OUTPATIENT)
Dept: CARDIOLOGY | Facility: CLINIC | Age: 54
End: 2019-06-11

## 2019-06-11 NOTE — TELEPHONE ENCOUNTER
----- Message from AIDEN Redmond sent at 6/10/2019  3:48 PM EDT -----  Can you please make patient aware of normal echocardiogram.    Thanks,  LAQUITA

## 2019-06-28 ENCOUNTER — OFFICE VISIT (OUTPATIENT)
Dept: FAMILY MEDICINE CLINIC | Facility: CLINIC | Age: 54
End: 2019-06-28

## 2019-06-28 VITALS
HEART RATE: 88 BPM | WEIGHT: 276 LBS | OXYGEN SATURATION: 98 % | RESPIRATION RATE: 16 BRPM | BODY MASS INDEX: 39.51 KG/M2 | SYSTOLIC BLOOD PRESSURE: 109 MMHG | DIASTOLIC BLOOD PRESSURE: 66 MMHG | TEMPERATURE: 97.8 F | HEIGHT: 70 IN

## 2019-06-28 DIAGNOSIS — I25.10 CORONARY ARTERY DISEASE DUE TO CALCIFIED CORONARY LESION: Primary | ICD-10-CM

## 2019-06-28 DIAGNOSIS — I25.84 CORONARY ARTERY DISEASE DUE TO CALCIFIED CORONARY LESION: Primary | ICD-10-CM

## 2019-06-28 DIAGNOSIS — I10 ESSENTIAL HYPERTENSION: ICD-10-CM

## 2019-06-28 DIAGNOSIS — E78.2 MIXED HYPERLIPIDEMIA: ICD-10-CM

## 2019-06-28 PROCEDURE — 99214 OFFICE O/P EST MOD 30 MIN: CPT | Performed by: FAMILY MEDICINE

## 2019-06-28 NOTE — PROGRESS NOTES
Subjective   Chief Complaint:   Chief Complaint   Patient presents with   • Results from Stress Test         History of Present Illness work-up for dyspnea on exertion.  No chest pain on exertion his treadmill stress test was negative his stress echo was normal.  I reviewed his labs he is trying to lose weight now he is started a walking program the past history not too long ago where he lost 100 pounds over the shortness of breath went away when he lost 100 pounds so he is doing that again and he stop smoking 2 packs a day and he quit smoking somata watch him time to come back in about 3 months and when to review his labs to            Ricardo Tomlinson 53 y.o. male who presents today for Medical Management of the below listed issues and medication refills.    ICD-10-CM ICD-9-CM   1. Coronary artery disease due to calcified coronary lesion I25.10 414.00    I25.84 414.4   2. Essential hypertension I10 401.9   3. Mixed hyperlipidemia E78.2 272.2        he has a problem list of   Patient Active Problem List   Diagnosis   • Hyperlipidemia   • Essential hypertension   • ADHD (attention deficit hyperactivity disorder)   • Depression   • CAD (coronary artery disease)   • Nicotine dependence   • Hypothyroidism   • Erectile dysfunction   • Colon cancer screening   .  Since the last visit, he has overall felt well.  he has been compliant with   Current Outpatient Medications:   •  amphetamine-dextroamphetamine XR (ADDERALL XR) 30 MG 24 hr capsule, Take 30 mg by mouth 2 (two) times a day., Disp: , Rfl:   •  ARIPiprazole (ABILIFY) 30 MG tablet, Take 30 mg by mouth every night at bedtime., Disp: , Rfl:   •  aspirin 325 MG tablet, Take 325 mg by mouth Daily., Disp: , Rfl:   •  atorvastatin (LIPITOR) 20 MG tablet, TAKE 1 TABLET DAILY, Disp: 90 tablet, Rfl: 1  •  desvenlafaxine (PRISTIQ) 100 MG 24 hr tablet, Take 200 mg by mouth Daily., Disp: , Rfl:   •  levothyroxine (SYNTHROID, LEVOTHROID) 25 MCG tablet, TAKE 1 TABLET DAILY, Disp:  "90 tablet, Rfl: 1  •  metoprolol succinate XL (TOPROL-XL) 50 MG 24 hr tablet, TAKE 1 TABLET DAILY, Disp: 90 tablet, Rfl: 1  •  ramipril (ALTACE) 5 MG capsule, TAKE 1 CAPSULE DAILY, Disp: 90 capsule, Rfl: 1  •  tamsulosin (FLOMAX) 0.4 MG capsule 24 hr capsule, , Disp: , Rfl:   •  VIAGRA 100 MG tablet, Take 1 tablet by mouth As Needed for erectile dysfunction., Disp: 6 tablet, Rfl: 5  •  amphetamine-dextroamphetamine (ADDERALL) 30 MG tablet, Take 30 mg by mouth daily., Disp: , Rfl: .  he denies medication side effects.    All of the chronic condition(s) listed above are stable w/o issues.    /66   Pulse 88   Temp 97.8 °F (36.6 °C)   Resp 16   Ht 177.8 cm (70\")   Wt 125 kg (276 lb)   SpO2 98%   BMI 39.60 kg/m²     Results for orders placed or performed during the hospital encounter of 06/07/19   Adult Transthoracic Echo Complete W/ Cont if Necessary Per Protocol   Result Value Ref Range    Target HR (85%) 141 bpm    Max. Pred. HR (100%) 166 bpm    TDI S' 14.00 cm/sec    RV Base 2.60 cm    Lat Peak E' Carlos 10.0 cm/sec    Med Peak E' Carlos 10.00 cm/sec    TAPSE (>1.6) 2.90 cm2    Ao root annulus 2.10 cm    LA Volume Index 10.0 mL/m2    EF(MOD-bp) 66 %    Dimensionless Index 0.8 (DI)    Avg E/e' ratio 7.50     ACS 1.9 cm    Ao pk carlos 128.0 cm/sec    Ao V2 VTI 21.0 cm    TACHO(I,D) 2.6 cm2    EDV(MOD-sp2) 63.0 ml    EDV(MOD-sp4) 65.0 ml    ESV(MOD-sp2) 22.0 ml    ESV(MOD-sp4) 24.0 ml    LV V1 max 96.0 cm/sec    LVPWd 1.2 cm    MV dec time 169 msec    MV P1/2t 47 msec    MV V2 VTI 18 cm    Pulm A Revs Carlos 31.0 cm/sec    RV V1 max 100.0 cm/sec    RV V1 VTI 16 cm    Ao max PG 7.0 mmHg    Ao mean PG 4.0 mmHg    IVSd 1.1 cm    LV V1 VTI 18.0 cm    LVIDd 5.3 cm    LVIDs 3.3 cm    LVOT diam 2.1 cm    MV E/A 1.2     MV mean PG 1 mmHg    Pulm S/D 1.4     RVOT diam 2.1 cm    MV A dur 106.0 sec    MV A max carlos 65.0 cm/sec    MV E max carlos 75.0 cm/sec    Pulm A Revs Dur 95.0 sec    Pulm Cole Carlos 37.0 cm/sec    Pulm Sys Carlos 51.0 " cm/sec             The following portions of the patient's history were reviewed and updated as appropriate: allergies, current medications, past family history, past medical history, past social history, past surgical history and problem list.    Review of Systems   Constitutional: Negative for activity change, appetite change and unexpected weight change.   Eyes: Negative for visual disturbance.   Respiratory: Negative for chest tightness and shortness of breath.    Cardiovascular: Negative for chest pain and palpitations.   Skin: Negative for color change.   Neurological: Negative for syncope and speech difficulty.   Psychiatric/Behavioral: Negative for confusion and decreased concentration.       Objective   Physical Exam   Constitutional: He is oriented to person, place, and time. He appears well-developed and well-nourished.   HENT:   Head: Atraumatic.   Mouth/Throat: Oropharynx is clear and moist.   Eyes: EOM are normal. Pupils are equal, round, and reactive to light.   Neck: Normal range of motion. Neck supple. No thyromegaly present.   Cardiovascular: Normal rate and regular rhythm.   Pulmonary/Chest: Effort normal and breath sounds normal.   Abdominal: Soft.   Musculoskeletal: Normal range of motion.   Neurological: He is alert and oriented to person, place, and time.   Skin: Skin is warm and dry.   Psychiatric: He has a normal mood and affect. His behavior is normal.   Nursing note and vitals reviewed.      Assessment/Plan   Ricardo was seen today for results from stress test.    Diagnoses and all orders for this visit:    Coronary artery disease due to calcified coronary lesion    Essential hypertension    Mixed hyperlipidemia

## 2019-06-28 NOTE — PATIENT INSTRUCTIONS
Exercise 30 minutes most days of the week  Sleep 6-8 hours each night if possible  Low fat, low cholesterol diet   we discussed prescribed medications and how to take them   make sure you get results of any labs/studies ordered today  Low glycemic index diet   Family do not go back to smoking cigarettes.  Continue exercise program and losing weight

## 2019-10-13 DIAGNOSIS — E78.2 MIXED HYPERLIPIDEMIA: ICD-10-CM

## 2019-10-13 DIAGNOSIS — I10 ESSENTIAL HYPERTENSION: ICD-10-CM

## 2019-10-13 DIAGNOSIS — E03.9 HYPOTHYROIDISM, UNSPECIFIED TYPE: ICD-10-CM

## 2019-10-13 RX ORDER — RAMIPRIL 5 MG/1
CAPSULE ORAL
Qty: 90 CAPSULE | Refills: 4 | Status: CANCELLED | OUTPATIENT
Start: 2019-10-13

## 2019-10-13 RX ORDER — ATORVASTATIN CALCIUM 20 MG/1
TABLET, FILM COATED ORAL
Qty: 90 TABLET | Refills: 4 | Status: CANCELLED | OUTPATIENT
Start: 2019-10-13

## 2019-10-13 RX ORDER — METOPROLOL SUCCINATE 50 MG/1
TABLET, EXTENDED RELEASE ORAL
Qty: 90 TABLET | Refills: 4 | Status: CANCELLED | OUTPATIENT
Start: 2019-10-13

## 2019-10-13 RX ORDER — LEVOTHYROXINE SODIUM 0.03 MG/1
TABLET ORAL
Qty: 90 TABLET | Refills: 4 | Status: CANCELLED | OUTPATIENT
Start: 2019-10-13

## 2019-10-15 ENCOUNTER — OFFICE VISIT (OUTPATIENT)
Dept: FAMILY MEDICINE CLINIC | Facility: CLINIC | Age: 54
End: 2019-10-15

## 2019-10-15 VITALS
BODY MASS INDEX: 37.51 KG/M2 | SYSTOLIC BLOOD PRESSURE: 119 MMHG | TEMPERATURE: 97.6 F | DIASTOLIC BLOOD PRESSURE: 75 MMHG | OXYGEN SATURATION: 99 % | RESPIRATION RATE: 16 BRPM | HEART RATE: 92 BPM | WEIGHT: 262 LBS | HEIGHT: 70 IN

## 2019-10-15 DIAGNOSIS — I25.10 CORONARY ARTERY DISEASE INVOLVING NATIVE CORONARY ARTERY OF NATIVE HEART WITHOUT ANGINA PECTORIS: ICD-10-CM

## 2019-10-15 DIAGNOSIS — I10 ESSENTIAL HYPERTENSION: Primary | ICD-10-CM

## 2019-10-15 DIAGNOSIS — E03.9 HYPOTHYROIDISM, UNSPECIFIED TYPE: ICD-10-CM

## 2019-10-15 DIAGNOSIS — Z23 ENCOUNTER FOR IMMUNIZATION: ICD-10-CM

## 2019-10-15 DIAGNOSIS — E78.2 MIXED HYPERLIPIDEMIA: ICD-10-CM

## 2019-10-15 PROCEDURE — 99214 OFFICE O/P EST MOD 30 MIN: CPT | Performed by: FAMILY MEDICINE

## 2019-10-15 PROCEDURE — 90471 IMMUNIZATION ADMIN: CPT | Performed by: FAMILY MEDICINE

## 2019-10-15 PROCEDURE — 90750 HZV VACC RECOMBINANT IM: CPT | Performed by: FAMILY MEDICINE

## 2019-10-15 RX ORDER — LEVOTHYROXINE SODIUM 0.03 MG/1
25 TABLET ORAL DAILY
Qty: 90 TABLET | Refills: 1 | Status: SHIPPED | OUTPATIENT
Start: 2019-10-15 | End: 2020-04-15 | Stop reason: SDUPTHER

## 2019-10-15 RX ORDER — ATORVASTATIN CALCIUM 20 MG/1
20 TABLET, FILM COATED ORAL DAILY
Qty: 90 TABLET | Refills: 1 | Status: SHIPPED | OUTPATIENT
Start: 2019-10-15 | End: 2020-04-15 | Stop reason: SDUPTHER

## 2019-10-15 RX ORDER — RAMIPRIL 5 MG/1
5 CAPSULE ORAL DAILY
Qty: 90 CAPSULE | Refills: 1 | Status: SHIPPED | OUTPATIENT
Start: 2019-10-15 | End: 2020-04-15 | Stop reason: SDUPTHER

## 2019-10-15 RX ORDER — METOPROLOL SUCCINATE 50 MG/1
50 TABLET, EXTENDED RELEASE ORAL DAILY
Qty: 90 TABLET | Refills: 1 | Status: SHIPPED | OUTPATIENT
Start: 2019-10-15 | End: 2020-04-15 | Stop reason: SDUPTHER

## 2019-10-15 RX ORDER — TOLTERODINE TARTRATE 2 MG/1
TABLET, EXTENDED RELEASE ORAL
COMMUNITY
Start: 2019-09-17

## 2019-10-15 NOTE — PATIENT INSTRUCTIONS
Exercise 30 minutes most days of the week  Sleep 6-8 hours each night if possible  Low fat, low cholesterol diet   we discussed prescribed medications and how to take them   make sure you get results of any labs/studies ordered today  Low glycemic index diet   Long discussion shots   Does not want flu shot       Will get shingles shot. Today         See me 6 months for cpe   Do labs now

## 2019-10-15 NOTE — PROGRESS NOTES
Subjective   Chief Complaint:   Chief Complaint   Patient presents with   • Hypertension   • Hyperlipidemia   • Hypothyroidism         History of Present Illness she comes in here today for follow-up of hypertension and hyperlipidemia and hypothyroid.  To smoke 2 packs/day of cigarettes and quit 10 months ago he still off cigarettes.  He has a history of ADD and sees another doctor.  He sees Dr. sewell  for cardiology.  He has a history of coronary artery disease.  He did not do his labs he is going to do those labs today.  I meant to order a CMP and a lipid and a TSH.  His blood pressure today by me is 110/72.  Not having any chest pain and is not having any shortness of breath feels better since he quit smoking cigarettes 10 months ago.  Will refill medications and get labs today and see him back in 6 months.  I will recheck the patient in 6 months for complete physical.  I reviewed his medications.Refill Lipitor 20 mg and levothyroxine 25 MCG's and metoprolol succinate or Toprol-XL 50 mg 1 a day and Altace 5 mg 1 a day. He also was due for his shingrix vaccine today and we updated that.            Ricardo Tomlinson 53 y.o. male who presents today for Medical Management of the below listed issues and medication refills.    ICD-10-CM ICD-9-CM   1. Essential hypertension I10 401.9   2. Hypothyroidism, unspecified type E03.9 244.9   3. Mixed hyperlipidemia E78.2 272.2   4. Coronary artery disease involving native coronary artery of native heart without angina pectoris I25.10 414.01   5. Encounter for immunization Z23 V03.89        he has a problem list of   Patient Active Problem List   Diagnosis   • Hyperlipidemia   • Essential hypertension   • ADHD (attention deficit hyperactivity disorder)   • Depression   • CAD (coronary artery disease)   • Hypothyroidism   • Erectile dysfunction   • Colon cancer screening   .    he has been compliant with   Current Outpatient Medications:   •  amphetamine-dextroamphetamine  "(ADDERALL) 30 MG tablet, Take 30 mg by mouth daily., Disp: , Rfl:   •  ARIPiprazole (ABILIFY) 30 MG tablet, Take 30 mg by mouth every night at bedtime., Disp: , Rfl:   •  aspirin 325 MG tablet, Take 325 mg by mouth Daily., Disp: , Rfl:   •  atorvastatin (LIPITOR) 20 MG tablet, Take 1 tablet by mouth Daily., Disp: 90 tablet, Rfl: 1  •  desvenlafaxine (PRISTIQ) 100 MG 24 hr tablet, Take 200 mg by mouth Daily., Disp: , Rfl:   •  levothyroxine (SYNTHROID, LEVOTHROID) 25 MCG tablet, Take 1 tablet by mouth Daily., Disp: 90 tablet, Rfl: 1  •  metoprolol succinate XL (TOPROL-XL) 50 MG 24 hr tablet, Take 1 tablet by mouth Daily., Disp: 90 tablet, Rfl: 1  •  ramipril (ALTACE) 5 MG capsule, Take 1 capsule by mouth Daily., Disp: 90 capsule, Rfl: 1  •  tamsulosin (FLOMAX) 0.4 MG capsule 24 hr capsule, , Disp: , Rfl:   •  tolterodine (DETROL) 2 MG tablet, , Disp: , Rfl:   •  VIAGRA 100 MG tablet, Take 1 tablet by mouth As Needed for erectile dysfunction., Disp: 6 tablet, Rfl: 5  •  amphetamine-dextroamphetamine XR (ADDERALL XR) 30 MG 24 hr capsule, Take 30 mg by mouth 2 (two) times a day., Disp: , Rfl: .  he denies medication side effects.        /75   Pulse 92   Temp 97.6 °F (36.4 °C)   Resp 16   Ht 177.8 cm (70\")   Wt 119 kg (262 lb)   SpO2 99%   BMI 37.59 kg/m²     Results for orders placed or performed in visit on 10/15/19   Comprehensive Metabolic Panel   Result Value Ref Range    Glucose 94 65 - 99 mg/dL    BUN 10 6 - 20 mg/dL    Creatinine 0.86 0.76 - 1.27 mg/dL    eGFR Non African Am 93 >60 mL/min/1.73    eGFR African Am 112 >60 mL/min/1.73    BUN/Creatinine Ratio 11.6 7.0 - 25.0    Sodium 142 136 - 145 mmol/L    Potassium 4.6 3.5 - 5.2 mmol/L    Chloride 104 98 - 107 mmol/L    Total CO2 27.0 22.0 - 29.0 mmol/L    Calcium 9.0 8.6 - 10.5 mg/dL    Total Protein 6.8 6.0 - 8.5 g/dL    Albumin 4.20 3.50 - 5.20 g/dL    Globulin 2.6 gm/dL    A/G Ratio 1.6 g/dL    Total Bilirubin 0.4 0.2 - 1.2 mg/dL    Alkaline " Phosphatase 109 39 - 117 U/L    AST (SGOT) 15 1 - 40 U/L    ALT (SGPT) 25 1 - 41 U/L   TSH   Result Value Ref Range    TSH 0.948 0.270 - 4.200 uIU/mL   Lipid Panel   Result Value Ref Range    Total Cholesterol 123 0 - 200 mg/dL    Triglycerides 94 0 - 150 mg/dL    HDL Cholesterol 39 (L) 40 - 60 mg/dL    VLDL Cholesterol 18.8 mg/dL    LDL Cholesterol  65 0 - 100 mg/dL             The following portions of the patient's history were reviewed and updated as appropriate: allergies, current medications, past family history, past medical history, past social history, past surgical history and problem list.    Review of Systems   Constitutional: Negative for activity change, appetite change and unexpected weight change.   Eyes: Negative for visual disturbance.   Respiratory: Negative for chest tightness and shortness of breath.    Cardiovascular: Negative for chest pain and palpitations.   Gastrointestinal: Negative for abdominal pain.   Genitourinary: Negative for difficulty urinating and frequency.   Musculoskeletal: Negative for back pain.   Skin: Negative for color change and rash.   Neurological: Negative for syncope and speech difficulty.   Psychiatric/Behavioral: Negative for confusion and decreased concentration.       Objective   Physical Exam   Constitutional: He is oriented to person, place, and time. He appears well-developed and well-nourished.   HENT:   Head: Atraumatic.   Mouth/Throat: Oropharynx is clear and moist.   Eyes: EOM are normal. Pupils are equal, round, and reactive to light.   Neck: Normal range of motion. Neck supple. No thyromegaly present.   Cardiovascular: Normal rate and regular rhythm.   Pulmonary/Chest: Effort normal and breath sounds normal.   Abdominal: Soft.   Musculoskeletal: Normal range of motion.   Neurological: He is alert and oriented to person, place, and time.   Skin: Skin is warm and dry.   Psychiatric: He has a normal mood and affect. His behavior is normal.   Nursing note and  vitals reviewed.      Assessment/Plan   Ricardo was seen today for hypertension, hyperlipidemia and hypothyroidism.    Diagnoses and all orders for this visit:    Essential hypertension  -     ramipril (ALTACE) 5 MG capsule; Take 1 capsule by mouth Daily.  -     metoprolol succinate XL (TOPROL-XL) 50 MG 24 hr tablet; Take 1 tablet by mouth Daily.  -     Comprehensive Metabolic Panel  -     TSH  -     Lipid Panel    Hypothyroidism, unspecified type  -     levothyroxine (SYNTHROID, LEVOTHROID) 25 MCG tablet; Take 1 tablet by mouth Daily.  -     Comprehensive Metabolic Panel  -     TSH  -     Lipid Panel    Mixed hyperlipidemia  -     atorvastatin (LIPITOR) 20 MG tablet; Take 1 tablet by mouth Daily.  -     Comprehensive Metabolic Panel  -     TSH  -     Lipid Panel    Coronary artery disease involving native coronary artery of native heart without angina pectoris  -     Comprehensive Metabolic Panel  -     TSH  -     Lipid Panel    Encounter for immunization  -     Shingrix Vaccine                 -Labs results discussed in detail with the patient, if no recent labs were done, order placed today.  Plan to update vaccines if needed today.  I  reviewed health maintenance with the patient as part of my preventative care plan. I discussed preventative counseling with the patient in detail.

## 2019-10-16 LAB
ALBUMIN SERPL-MCNC: 4.2 G/DL (ref 3.5–5.2)
ALBUMIN/GLOB SERPL: 1.6 G/DL
ALP SERPL-CCNC: 109 U/L (ref 39–117)
ALT SERPL-CCNC: 25 U/L (ref 1–41)
AST SERPL-CCNC: 15 U/L (ref 1–40)
BILIRUB SERPL-MCNC: 0.4 MG/DL (ref 0.2–1.2)
BUN SERPL-MCNC: 10 MG/DL (ref 6–20)
BUN/CREAT SERPL: 11.6 (ref 7–25)
CALCIUM SERPL-MCNC: 9 MG/DL (ref 8.6–10.5)
CHLORIDE SERPL-SCNC: 104 MMOL/L (ref 98–107)
CHOLEST SERPL-MCNC: 123 MG/DL (ref 0–200)
CO2 SERPL-SCNC: 27 MMOL/L (ref 22–29)
CREAT SERPL-MCNC: 0.86 MG/DL (ref 0.76–1.27)
GLOBULIN SER CALC-MCNC: 2.6 GM/DL
GLUCOSE SERPL-MCNC: 94 MG/DL (ref 65–99)
HDLC SERPL-MCNC: 39 MG/DL (ref 40–60)
LDLC SERPL CALC-MCNC: 65 MG/DL (ref 0–100)
POTASSIUM SERPL-SCNC: 4.6 MMOL/L (ref 3.5–5.2)
PROT SERPL-MCNC: 6.8 G/DL (ref 6–8.5)
SODIUM SERPL-SCNC: 142 MMOL/L (ref 136–145)
TRIGL SERPL-MCNC: 94 MG/DL (ref 0–150)
TSH SERPL DL<=0.005 MIU/L-ACNC: 0.95 UIU/ML (ref 0.27–4.2)
VLDLC SERPL CALC-MCNC: 18.8 MG/DL

## 2019-11-15 ENCOUNTER — OFFICE VISIT (OUTPATIENT)
Dept: CARDIOLOGY | Facility: CLINIC | Age: 54
End: 2019-11-15

## 2019-11-15 VITALS
HEART RATE: 84 BPM | WEIGHT: 256 LBS | DIASTOLIC BLOOD PRESSURE: 72 MMHG | OXYGEN SATURATION: 98 % | SYSTOLIC BLOOD PRESSURE: 116 MMHG | HEIGHT: 70 IN | BODY MASS INDEX: 36.65 KG/M2

## 2019-11-15 DIAGNOSIS — I10 ESSENTIAL HYPERTENSION: ICD-10-CM

## 2019-11-15 DIAGNOSIS — E78.2 MIXED HYPERLIPIDEMIA: ICD-10-CM

## 2019-11-15 DIAGNOSIS — I25.84 CORONARY ARTERY DISEASE DUE TO CALCIFIED CORONARY LESION: Primary | ICD-10-CM

## 2019-11-15 DIAGNOSIS — I25.10 CORONARY ARTERY DISEASE DUE TO CALCIFIED CORONARY LESION: Primary | ICD-10-CM

## 2019-11-15 PROCEDURE — 99214 OFFICE O/P EST MOD 30 MIN: CPT | Performed by: NURSE PRACTITIONER

## 2019-11-15 NOTE — PROGRESS NOTES
"      [facility] CARDIOLOGY  [address]  [facility] [address]  Phone: [phone]      Patient Name: Ricardo Tomlinson  :1965  Age: 53 y.o.  Primary Cardiologist: Curtis Long MD  Encounter Provider:  AIDEN Redmond      Chief Complaint:   Chief Complaint   Patient presents with   • Follow-up     6 months         HPI  Ricardo Tomlinson is a 53 y.o. male who presents today for semiannual follow-up.     Pt has a  history significant for hyperlipidemia, hypertension, CAD.    Patient reports that he is done well over the past 6 months.  States that he has made some lifestyle changes with diet and exercise and his lost weight.  He reports since losing weight his energy and shortness of breath have improved.  States that he has not had any episodes of chest pain, palpitations, lightheadedness or lower extremity edema, fatigue.  States that blood pressures have been stable.  Reports that he is exercising routinely.    The following portions of the patient's history were reviewed and updated as appropriate: allergies, current medications, past family history, past medical history, past social history, past surgical history and problem list.      Review of Systems   Constitution: Negative for malaise/fatigue.   HENT: Positive for hearing loss.    Cardiovascular: Negative for chest pain and leg swelling.   Respiratory: Positive for shortness of breath.    Skin: Positive for color change and itching.   Genitourinary: Positive for decreased libido.   Neurological: Negative for light-headedness.   Psychiatric/Behavioral: Positive for depression and substance abuse.   All other systems reviewed and are negative.      OBJECTIVE:     Vitals:    11/15/19 0954   Weight: 116 kg (256 lb)   Height: 177.8 cm (70\")     Body mass index is 36.73 kg/m².    Physical Exam   Constitutional: He is oriented to person, place, and time. Vital signs are normal. He appears well-developed " and well-nourished.   Eyes: Conjunctivae are normal.   Neck: Carotid bruit is not present.   Cardiovascular: Normal rate, regular rhythm and normal heart sounds.   No murmur heard.  Pulmonary/Chest: Effort normal and breath sounds normal.   Abdominal: Normal appearance.   Musculoskeletal: Normal range of motion.   No pedal edema   Neurological: He is alert and oriented to person, place, and time. GCS eye subscore is 4. GCS verbal subscore is 5. GCS motor subscore is 6.   Skin: Skin is warm and dry.   Psychiatric: He has a normal mood and affect. His speech is normal and behavior is normal. Judgment and thought content normal. Cognition and memory are normal.       Procedures    Cardiac Procedures:  1. Treadmill stress test 5/3/2019.  Negative for clinical evidence of myocardial ischemia.  Findings consistent with normal ECG stress test.  2. Echocardiogram 6/7/2019.  EF 66%.  Calcification of aortic valve.  Mild LVH.        ASSESSMENT:      Diagnosis Plan   1. Coronary artery disease due to calcified coronary lesion     2. Essential hypertension     3. Mixed hyperlipidemia           PLAN OF CARE:     1. CAD.  Patient doing well from cardiovascular standpoint.  He is asymptomatic.  He is currently making lifestyle changes with diet and exercise and has lost weight.  Reports that he feels significantly better with weight loss.  He will continue GDMT with aspirin, metoprolol succinate, ramipril.  2. HTN.  Patient reports control blood pressures at home.  Blood pressure very stable in office today.  Continue current regimen.  3. Hyperlipidemia.  Lipids managed by primary care physician.  Last evaluated 10/15/2019 revealed total cholesterol 123, triglycerides 94, HDL 39, LDL 65.  4. Follow-up with Dr. Long in 1 year.  Sooner for problems or complications.             Discharge Medications           Accurate as of 11/15/19  9:58 AM. If you have any questions, ask your nurse or doctor.               Continue These  Medications      Instructions Start Date   amphetamine-dextroamphetamine 30 MG tablet  Commonly known as:  ADDERALL   30 mg, Oral, Daily      amphetamine-dextroamphetamine XR 30 MG 24 hr capsule  Commonly known as:  ADDERALL XR   30 mg, Oral, 2 Times Daily      ARIPiprazole 30 MG tablet  Commonly known as:  ABILIFY   30 mg, Oral, Every Night at Bedtime      aspirin 325 MG tablet   325 mg, Oral, Daily      atorvastatin 20 MG tablet  Commonly known as:  LIPITOR   20 mg, Oral, Daily      desvenlafaxine 100 MG 24 hr tablet  Commonly known as:  PRISTIQ   200 mg, Oral, Daily      levothyroxine 25 MCG tablet  Commonly known as:  SYNTHROID, LEVOTHROID   25 mcg, Oral, Daily      metoprolol succinate XL 50 MG 24 hr tablet  Commonly known as:  TOPROL-XL   50 mg, Oral, Daily      ramipril 5 MG capsule  Commonly known as:  ALTACE   5 mg, Oral, Daily      tamsulosin 0.4 MG capsule 24 hr capsule  Commonly known as:  FLOMAX   No dose, route, or frequency recorded.      tolterodine 2 MG tablet  Commonly known as:  DETROL   No dose, route, or frequency recorded.      VIAGRA 100 MG tablet  Generic drug:  sildenafil   100 mg, Oral, As Needed             Thank you for allowing me to participate in the care of your patient,         AIDEN Redmond  [facility]  11/15/19  9:58 AM    **Joe Disclaimer:**  Much of this encounter note is an electronic transcription/translation of spoken language to printed text. The electronic translation of spoken language may permit erroneous, or at times, nonsensical words or phrases to be inadvertently transcribed. Although I have reviewed the note for such errors, some may still exist.

## 2019-11-25 NOTE — PROGRESS NOTES
Subjective   Ricardo Tomlinson is a 51 y.o. male.     History of Present Illness   Chief Complaint:   Chief Complaint   Patient presents with   • Annual Exam   • Hypertension   • Erectile Dysfunction   • Hyperlipidemia       Ricardo Tomlinson 51 y.o. male who presents today for his annual physical exam and  Medical Management of the below listed issues. I refilled his medications. His blood pressure is 140/87 and 134/83 in the office today. He stated that her ran out of his blood pressure medication 3 days ago.  I reviewed his lab results. He had an EKG and a CXR in the office today. He complains of right knee pain when he walks on an incline and SOB. No fu  Sob was in shower today.  He needs to stop smoking cigarettes. He will bring in his immunization records. I gave him the packet for Gastro to schedule a colonoscopy.    he has a history of   Patient Active Problem List   Diagnosis   • Hyperlipidemia   • Essential hypertension   • ADHD (attention deficit hyperactivity disorder)   • Depression   • CAD (coronary artery disease)   • Nicotine dependence   • Hypothyroidism   • Erectile dysfunction   .  Since the last visit, he has overall felt well.  he has been compliant with   Current Outpatient Prescriptions:   •  amphetamine-dextroamphetamine (ADDERALL) 30 MG tablet, Take 30 mg by mouth daily., Disp: , Rfl:   •  amphetamine-dextroamphetamine XR (ADDERALL XR) 30 MG 24 hr capsule, Take 30 mg by mouth 2 (two) times a day., Disp: , Rfl:   •  ARIPiprazole (ABILIFY) 30 MG tablet, , Disp: , Rfl:   •  atorvastatin (LIPITOR) 20 MG tablet, Take 1 tablet by mouth daily., Disp: 90 tablet, Rfl: 1  •  desvenlafaxine (PRISTIQ) 100 MG 24 hr tablet, , Disp: , Rfl:   •  gabapentin (NEURONTIN) 300 MG capsule, 300 mg daily., Disp: , Rfl:   •  levothyroxine (SYNTHROID, LEVOTHROID) 25 MCG tablet, Take 1 tablet by mouth daily., Disp: 90 tablet, Rfl: 1  •  metoprolol succinate XL (TOPROL-XL) 50 MG 24 hr tablet, Take 1 tablet by mouth  "daily., Disp: 90 tablet, Rfl: 1  •  PRISTIQ 100 MG 24 hr tablet, Take 100 mg by mouth daily., Disp: , Rfl:   •  ramipril (ALTACE) 5 MG capsule, Take 1 capsule by mouth daily., Disp: 90 capsule, Rfl: 1  •  VIAGRA 100 MG tablet, Take 1 tablet by mouth as needed (ED)., Disp: 6 tablet, Rfl: 5.  he denies medication side effects.    All of the chronic condition(s) listed above are stable w/o issues.    /83  Pulse 115  Temp 97.3 °F (36.3 °C) (Oral)   Resp 16  Ht 70\" (177.8 cm)  Wt 278 lb (126 kg)  SpO2 100%  BMI 39.89 kg/m2    Results for orders placed or performed in visit on 01/26/17   Comprehensive metabolic panel   Result Value Ref Range    Glucose 80 65 - 99 mg/dL    BUN 7 6 - 20 mg/dL    Creatinine 0.94 0.76 - 1.27 mg/dL    eGFR Non African Am 84 >60 mL/min/1.73    eGFR African Am 102 >60 mL/min/1.73    BUN/Creatinine Ratio 7.4 7.0 - 25.0    Sodium 141 136 - 145 mmol/L    Potassium 3.8 3.5 - 5.2 mmol/L    Chloride 101 98 - 107 mmol/L    Total CO2 22.7 22.0 - 29.0 mmol/L    Calcium 9.3 8.6 - 10.5 mg/dL    Total Protein 6.9 6.0 - 8.5 g/dL    Albumin 4.10 3.50 - 5.20 g/dL    Globulin 2.8 gm/dL    A/G Ratio 1.5 g/dL    Total Bilirubin 0.4 0.1 - 1.2 mg/dL    Alkaline Phosphatase 88 39 - 117 U/L    AST (SGOT) 19 1 - 40 U/L    ALT (SGPT) 32 1 - 41 U/L   Lipid panel   Result Value Ref Range    Total Cholesterol 197 0 - 200 mg/dL    Triglycerides 99 0 - 150 mg/dL    HDL Cholesterol 43 40 - 60 mg/dL    VLDL Cholesterol 19.8 5 - 40 mg/dL    LDL Cholesterol  134 (H) 0 - 100 mg/dL   TSH   Result Value Ref Range    TSH 2.030 0.270 - 4.200 mIU/mL   PSA   Result Value Ref Range    PSA 0.655 0.000 - 4.000 ng/mL   Microscopic Examination   Result Value Ref Range    WBC, UA Comment /hpf    RBC, UA 0-2 /hpf    Epithelial Cells (non renal) Comment /hpf    Cast Type Comment     Mucus, UA See below: (A) /hpf    Bacteria, UA Comment None Seen /hpf   CBC and Differential   Result Value Ref Range    WBC 5.38 4.50 - 10.70 " 10*3/mm3    RBC 5.00 4.60 - 6.00 10*6/mm3    Hemoglobin 15.2 13.7 - 17.6 g/dL    Hematocrit 43.7 40.4 - 52.2 %    MCV 87.4 79.8 - 96.2 fL    MCH 30.4 27.0 - 32.7 pg    MCHC 34.8 32.6 - 36.4 g/dL    RDW 14.7 (H) 11.5 - 14.5 %    Platelets 225 140 - 500 10*3/mm3    Neutrophil Rel % 56.9 42.7 - 76.0 %    Lymphocyte Rel % 33.6 19.6 - 45.3 %    Monocyte Rel % 7.8 5.0 - 12.0 %    Eosinophil Rel % 1.3 0.3 - 6.2 %    Basophil Rel % 0.4 0.0 - 1.5 %    Neutrophils Absolute 3.06 1.90 - 8.10 10*3/mm3    Lymphocytes Absolute 1.81 0.90 - 4.80 10*3/mm3    Monocytes Absolute 0.42 0.20 - 1.20 10*3/mm3    Eosinophils Absolute 0.07 0.00 - 0.70 10*3/mm3    Basophils Absolute 0.02 0.00 - 0.20 10*3/mm3    Immature Granulocyte Rel % 0.0 0.0 - 0.5 %    Immature Grans Absolute 0.00 0.00 - 0.03 10*3/mm3   Urinalysis with microscope   Result Value Ref Range    Specific Gravity, UA 1.023 1.005 - 1.030    pH, UA 6.0 5.0 - 8.0    Color, UA See below: (A)     Appearance, UA Clear Clear    Leukocytes, UA Negative Negative    Protein Negative Negative    Glucose, UA Negative Negative    Ketones Negative Negative    Blood, UA Negative Negative    Bilirubin, UA Negative Negative    Urobilinogen, UA Comment     Nitrite, UA Negative Negative         The following portions of the patient's history were reviewed and updated as appropriate: allergies, current medications, past family history, past medical history, past social history, past surgical history and problem list.    Review of Systems   Constitutional: Negative for activity change, appetite change and unexpected weight change.   Eyes: Negative for visual disturbance.   Respiratory: Positive for shortness of breath. Negative for chest tightness.    Cardiovascular: Negative for chest pain and palpitations.   Musculoskeletal:        Right knee pain   Skin: Negative for color change.   Neurological: Negative for syncope and speech difficulty.   Psychiatric/Behavioral: Negative for confusion and  decreased concentration.       Objective   Physical Exam   Constitutional: He is oriented to person, place, and time. He appears well-developed.   HENT:   Head: Normocephalic and atraumatic.   Nose: Nose normal.   Mouth/Throat: Oropharynx is clear and moist.   Eyes: Conjunctivae and EOM are normal. Pupils are equal, round, and reactive to light.   Neck: Normal range of motion. Neck supple. No thyromegaly present.   Cardiovascular: Normal rate, regular rhythm, normal heart sounds and intact distal pulses.    Pulmonary/Chest: Effort normal. He has no wheezes. He has no rales.   Abdominal: Soft. He exhibits no mass. There is no tenderness.   Genitourinary: Prostate normal.   Musculoskeletal: Normal range of motion. He exhibits no edema or deformity.   Right knee neg exam   Lymphadenopathy:     He has no cervical adenopathy.   Neurological: He is alert and oriented to person, place, and time. He has normal reflexes.   Skin: Skin is warm and dry.   Psychiatric: He has a normal mood and affect. His behavior is normal.   Nursing note and vitals reviewed.    Views: lateral and posterior-anterior    Relevant Clinical Issues/Diagnoses/Indications for XRay: see HPI  Clinical Findings: Chest: was negative for infiltrate, effusion, pneumothorax, or wide mediastinum    Compared with previous XRay? yes    Date of Previous Xray:03/10/2016    Changes on current Xray? no    Assessment/Plan   Ricardo was seen today for annual exam, hypertension, erectile dysfunction and hyperlipidemia.    Diagnoses and all orders for this visit:    Annual physical exam  -     XR Chest PA & Lateral  -     ECG 12 Lead    Mixed hyperlipidemia  -     atorvastatin (LIPITOR) 20 MG tablet; Take 1 tablet by mouth Daily.    Essential hypertension  -     metoprolol succinate XL (TOPROL-XL) 50 MG 24 hr tablet; Take 1 tablet by mouth Daily.  -     ramipril (ALTACE) 5 MG capsule; Take 1 capsule by mouth Daily.    Erectile dysfunction, unspecified erectile  dysfunction type  -     VIAGRA 100 MG tablet; Take 1 tablet by mouth As Needed (ED).    Hypothyroidism, unspecified type  -     levothyroxine (SYNTHROID, LEVOTHROID) 25 MCG tablet; Take 1 tablet by mouth Daily.    Other orders  -     gabapentin (NEURONTIN) 300 MG capsule; Take 1 capsule by mouth Daily.                - last stents 3 years ago, trops negative, ekg nsr with RBBB unclear if new, no chest pain, sob, ACS ruled out  - c/w ASA, plavix, atorvastatin, imdur 30mg Pt is RCRI class III risk (1 point each due to ischemic heart disease, preop requirement for insulin) with 10.1% risk of death, MI or cardiac arrest within 30 days of procedure. Pt is currently moderate to high risk for a moderate-high risk procedure (RLE vascular bypass/revascularization). Pt is currently without any CP, SOB, or fever. He has good functional status at home and is independent in all ADLs. EKG performed during this admission showed right bundle branch block, however unclear if new, recent ischemic workup on 11/16 negative. No history of heart failure, TTE from 2016 showing low-normal LV systolic fxn with EF of 55%. Pt with recent LE angiogram performed with no anesthetic or surgical complications. Patient is currently medically optimized for this procedure.

## 2020-04-01 DIAGNOSIS — R97.20 ABNORMAL PSA: ICD-10-CM

## 2020-04-01 DIAGNOSIS — Z12.5 SCREENING PSA (PROSTATE SPECIFIC ANTIGEN): ICD-10-CM

## 2020-04-01 DIAGNOSIS — E03.9 HYPOTHYROIDISM, UNSPECIFIED TYPE: ICD-10-CM

## 2020-04-01 DIAGNOSIS — E78.5 HYPERLIPIDEMIA, UNSPECIFIED HYPERLIPIDEMIA TYPE: Primary | ICD-10-CM

## 2020-04-07 LAB
ALBUMIN SERPL-MCNC: 4 G/DL (ref 3.5–5.2)
ALBUMIN/GLOB SERPL: 1.4 G/DL
ALP SERPL-CCNC: 94 U/L (ref 39–117)
ALT SERPL-CCNC: 19 U/L (ref 1–41)
AST SERPL-CCNC: 13 U/L (ref 1–40)
BILIRUB SERPL-MCNC: 0.6 MG/DL (ref 0.2–1.2)
BUN SERPL-MCNC: 11 MG/DL (ref 6–20)
BUN/CREAT SERPL: 11.3 (ref 7–25)
CALCIUM SERPL-MCNC: 9.4 MG/DL (ref 8.6–10.5)
CHLORIDE SERPL-SCNC: 100 MMOL/L (ref 98–107)
CHOLEST SERPL-MCNC: 156 MG/DL (ref 0–200)
CO2 SERPL-SCNC: 26.2 MMOL/L (ref 22–29)
CREAT SERPL-MCNC: 0.97 MG/DL (ref 0.76–1.27)
GLOBULIN SER CALC-MCNC: 2.8 GM/DL
GLUCOSE SERPL-MCNC: 96 MG/DL (ref 65–99)
HDLC SERPL-MCNC: 46 MG/DL (ref 40–60)
LDLC SERPL CALC-MCNC: 91 MG/DL (ref 0–100)
POTASSIUM SERPL-SCNC: 4.4 MMOL/L (ref 3.5–5.2)
PROT SERPL-MCNC: 6.8 G/DL (ref 6–8.5)
PSA SERPL-MCNC: 0.66 NG/ML (ref 0–4)
SODIUM SERPL-SCNC: 139 MMOL/L (ref 136–145)
TRIGL SERPL-MCNC: 94 MG/DL (ref 0–150)
TSH SERPL DL<=0.005 MIU/L-ACNC: 2.97 UIU/ML (ref 0.27–4.2)
VLDLC SERPL CALC-MCNC: 18.8 MG/DL

## 2020-04-12 DIAGNOSIS — E78.2 MIXED HYPERLIPIDEMIA: ICD-10-CM

## 2020-04-12 DIAGNOSIS — I10 ESSENTIAL HYPERTENSION: ICD-10-CM

## 2020-04-12 DIAGNOSIS — E03.9 HYPOTHYROIDISM, UNSPECIFIED TYPE: ICD-10-CM

## 2020-04-13 RX ORDER — METOPROLOL SUCCINATE 50 MG/1
TABLET, EXTENDED RELEASE ORAL
Qty: 90 TABLET | Refills: 3 | OUTPATIENT
Start: 2020-04-13

## 2020-04-13 RX ORDER — RAMIPRIL 5 MG/1
CAPSULE ORAL
Qty: 90 CAPSULE | Refills: 3 | OUTPATIENT
Start: 2020-04-13

## 2020-04-13 RX ORDER — ATORVASTATIN CALCIUM 20 MG/1
TABLET, FILM COATED ORAL
Qty: 90 TABLET | Refills: 3 | OUTPATIENT
Start: 2020-04-13

## 2020-04-13 RX ORDER — LEVOTHYROXINE SODIUM 0.03 MG/1
TABLET ORAL
Qty: 90 TABLET | Refills: 3 | OUTPATIENT
Start: 2020-04-13

## 2020-04-15 ENCOUNTER — OFFICE VISIT (OUTPATIENT)
Dept: FAMILY MEDICINE CLINIC | Facility: CLINIC | Age: 55
End: 2020-04-15

## 2020-04-15 DIAGNOSIS — E78.2 MIXED HYPERLIPIDEMIA: ICD-10-CM

## 2020-04-15 DIAGNOSIS — I10 ESSENTIAL HYPERTENSION: ICD-10-CM

## 2020-04-15 DIAGNOSIS — E03.9 HYPOTHYROIDISM, UNSPECIFIED TYPE: ICD-10-CM

## 2020-04-15 PROCEDURE — 99442 PR PHYS/QHP TELEPHONE EVALUATION 11-20 MIN: CPT | Performed by: FAMILY MEDICINE

## 2020-04-15 RX ORDER — ATORVASTATIN CALCIUM 20 MG/1
20 TABLET, FILM COATED ORAL DAILY
Qty: 90 TABLET | Refills: 1 | Status: SHIPPED | OUTPATIENT
Start: 2020-04-15

## 2020-04-15 RX ORDER — LEVOTHYROXINE SODIUM 0.03 MG/1
25 TABLET ORAL DAILY
Qty: 90 TABLET | Refills: 1 | Status: SHIPPED | OUTPATIENT
Start: 2020-04-15

## 2020-04-15 RX ORDER — METOPROLOL SUCCINATE 50 MG/1
50 TABLET, EXTENDED RELEASE ORAL DAILY
Qty: 90 TABLET | Refills: 1 | Status: SHIPPED | OUTPATIENT
Start: 2020-04-15

## 2020-04-15 RX ORDER — RAMIPRIL 5 MG/1
5 CAPSULE ORAL DAILY
Qty: 90 CAPSULE | Refills: 1 | Status: SHIPPED | OUTPATIENT
Start: 2020-04-15

## 2020-04-15 NOTE — PROGRESS NOTES
You have chosen to receive care through a telephone visit. Do you consent to use a telephone visit for your medical care today? Yes    I went over his history in detail.  He has a history of ADHD anxiety and depression coronary artery disease he has had a coronary stent has hyperlipidemia he has hypertension he has obesity we reviewed those diagnosis I reviewed his labs he has lost 10 to 15 pounds he is not smoking he quit 16 months ago I will recheck him in 6 months I refilled his medications he has diagnoses of ADD and ADHD and anxiety and depression again he has a history of coronary artery disease with stent.  He has hyperlipidemia hypertension is had an MI in the past and again he has hypertension and hyperlipidemia although he is lost 10 to 15 pounds in the last 2 months I reviewed his recent labs in detail with him his PSA was 0.664 and his glucose was 96 and his lipid panel was good and I refilled his Lipitor 20 mg and his Synthroid 25 MCG's and his Toprol-XL 50 mg and his Altase 5 mg he is going to recheck with me in 6 months.  This was a telephone to telephone visit and it was total time 15 minutes and it was times was 11 to 20 minutes in the code is a 89831.

## 2020-10-12 DIAGNOSIS — I10 ESSENTIAL HYPERTENSION: ICD-10-CM

## 2020-10-12 DIAGNOSIS — E03.9 HYPOTHYROIDISM, UNSPECIFIED TYPE: ICD-10-CM

## 2020-10-12 DIAGNOSIS — E78.2 MIXED HYPERLIPIDEMIA: ICD-10-CM

## 2020-10-14 RX ORDER — RAMIPRIL 5 MG/1
CAPSULE ORAL
Qty: 90 CAPSULE | Refills: 3 | OUTPATIENT
Start: 2020-10-14

## 2020-10-14 RX ORDER — METOPROLOL SUCCINATE 50 MG/1
TABLET, EXTENDED RELEASE ORAL
Qty: 90 TABLET | Refills: 3 | OUTPATIENT
Start: 2020-10-14

## 2020-10-14 RX ORDER — LEVOTHYROXINE SODIUM 0.03 MG/1
TABLET ORAL
Qty: 90 TABLET | Refills: 3 | OUTPATIENT
Start: 2020-10-14

## 2020-10-14 RX ORDER — ATORVASTATIN CALCIUM 20 MG/1
TABLET, FILM COATED ORAL
Qty: 90 TABLET | Refills: 3 | OUTPATIENT
Start: 2020-10-14

## 2024-05-21 ENCOUNTER — OFFICE VISIT (OUTPATIENT)
Dept: CARDIOLOGY | Facility: CLINIC | Age: 59
End: 2024-05-21
Payer: COMMERCIAL

## 2024-05-21 VITALS
WEIGHT: 252 LBS | DIASTOLIC BLOOD PRESSURE: 77 MMHG | HEART RATE: 78 BPM | BODY MASS INDEX: 37.33 KG/M2 | SYSTOLIC BLOOD PRESSURE: 120 MMHG | HEIGHT: 69 IN

## 2024-05-21 DIAGNOSIS — I25.2 HISTORY OF HEART ATTACK: ICD-10-CM

## 2024-05-21 DIAGNOSIS — I10 PRIMARY HYPERTENSION: Primary | ICD-10-CM

## 2024-05-21 DIAGNOSIS — E78.2 MIXED HYPERLIPIDEMIA: ICD-10-CM

## 2024-05-21 DIAGNOSIS — I25.10 CAD IN NATIVE ARTERY: ICD-10-CM

## 2024-05-21 DIAGNOSIS — Z95.5 PRESENCE OF DRUG COATED STENT IN CORONARY ARTERY: ICD-10-CM

## 2024-05-21 PROBLEM — E78.5 HYPERLIPIDEMIA: Status: ACTIVE | Noted: 2024-05-21

## 2024-05-21 PROCEDURE — 3074F SYST BP LT 130 MM HG: CPT | Performed by: NURSE PRACTITIONER

## 2024-05-21 PROCEDURE — 99213 OFFICE O/P EST LOW 20 MIN: CPT | Performed by: NURSE PRACTITIONER

## 2024-05-21 PROCEDURE — 3078F DIAST BP <80 MM HG: CPT | Performed by: NURSE PRACTITIONER

## 2024-05-21 RX ORDER — DESVENLAFAXINE 100 MG/1
2 TABLET, EXTENDED RELEASE ORAL DAILY
COMMUNITY
Start: 2020-12-29

## 2024-05-21 RX ORDER — METOPROLOL SUCCINATE 50 MG/1
1 TABLET, EXTENDED RELEASE ORAL DAILY
COMMUNITY

## 2024-05-21 RX ORDER — DEXTROAMPHETAMINE SACCHARATE, AMPHETAMINE ASPARTATE MONOHYDRATE, DEXTROAMPHETAMINE SULFATE AND AMPHETAMINE SULFATE 7.5; 7.5; 7.5; 7.5 MG/1; MG/1; MG/1; MG/1
30 CAPSULE, EXTENDED RELEASE ORAL
COMMUNITY

## 2024-05-21 RX ORDER — TOLTERODINE TARTRATE 2 MG/1
TABLET, EXTENDED RELEASE ORAL
COMMUNITY
Start: 2024-03-17

## 2024-05-21 RX ORDER — ARIPIPRAZOLE 30 MG/1
1 TABLET ORAL DAILY
COMMUNITY
Start: 2021-02-08

## 2024-05-21 RX ORDER — LEVOTHYROXINE SODIUM 25 UG/1
TABLET ORAL
COMMUNITY
Start: 2021-04-01

## 2024-05-21 RX ORDER — RAMIPRIL 5 MG/1
1 CAPSULE ORAL DAILY
COMMUNITY
Start: 2021-01-11

## 2024-05-21 RX ORDER — ASPIRIN 81 MG/1
1 TABLET ORAL DAILY
COMMUNITY

## 2024-05-21 RX ORDER — TAMSULOSIN HYDROCHLORIDE 0.4 MG/1
1 CAPSULE ORAL DAILY
COMMUNITY
Start: 2021-03-01

## 2024-05-21 RX ORDER — ATORVASTATIN CALCIUM 20 MG/1
1 TABLET, FILM COATED ORAL DAILY
COMMUNITY
Start: 2021-01-11

## 2024-05-21 NOTE — PROGRESS NOTES
Primary Care Physician: Rajan Crawford PA-C  Primary Cardiologist:       Date of Visit: 05/21/2024  3:20 PM EDT  Location of visit:  [facility]   Type of Visit: Follow up             Chief Complaint   Patient presents with    Follow-up     Here for 1 year follow up no concerns today        HPI / Summary:   Talat Hillman is a 58 y.o. male  with obese, former smoker with CAD remote MI s/p PCI (2003), HTN, HLD, hypothyroid on replacement, ADD, who returns for routine follow up        Feeling well without chest discomfort or unusual dyspnea   Now follows with Dr. Adan   Plans to start riding his bicycle this summer       12 system review is negative except as noted above         Medical History:   History reviewed. No pertinent past medical history.    Social History:   Tobacco Use: Low Risk  (5/21/2024)    Patient History     Smoking Tobacco Use: Never     Smokeless Tobacco Use: Never     Passive Exposure: Not on file         MEDICATIONS:   Current Outpatient Medications   Medication Instructions    amphetamine-dextroamphetamine XR (Adderall XR) 30 mg 24 hr capsule 30 mg, oral, Daily before breakfast    ARIPiprazole (Abilify) 30 mg tablet 1 tablet, oral, Daily    aspirin 81 mg EC tablet 1 tablet, oral, Daily    atorvastatin (Lipitor) 20 mg tablet 1 tablet, oral, Daily    desvenlafaxine 100 mg 24 hr tablet 2 tablets, oral, Daily    levothyroxine (Synthroid, Levoxyl) 25 mcg tablet oral    metoprolol succinate XL (Toprol-XL) 50 mg 24 hr tablet 1 tablet, oral, Daily    ramipril (Altace) 5 mg capsule 1 capsule, oral, Daily    tamsulosin (Flomax) 0.4 mg 24 hr capsule 1 capsule, oral, Daily    tolterodine (Detrol) 2 mg tablet          IMAGING REVIEWED:     Echocardiogram 6/07/2019  CONCLUSIONS:    Left ventricular wall thickness is consistent with mild concentric  hypertrophy.  · Left ventricular systolic function is normal.  · There is calcification of the aortic valve.  · Calculated EF = 66%.          LABS:  CBC with  "Differential:    Lab Results   Component Value Date    WBC 7.3 11/16/2020    RBC 5.30 11/16/2020    HGB 15.2 11/16/2020    HCT 45.0 11/16/2020     11/16/2020    MCV 85 11/16/2020    MCHC 33.8 11/16/2020    RDW 14.5 11/16/2020     CMP:    Lab Results   Component Value Date     11/16/2020    K 4.0 11/16/2020     11/16/2020    CO2 29 11/16/2020    BUN 8 11/16/2020    CREATININE 0.81 11/16/2020    GLUCOSE 95 11/16/2020    PROT 6.4 10/14/2020    CALCIUM 8.9 11/16/2020    BILITOT 0.4 10/14/2020    ALKPHOS 84 10/14/2020    AST 11 10/14/2020    ALT 17 10/14/2020     BMP:    Lab Results   Component Value Date     11/16/2020    K 4.0 11/16/2020     11/16/2020    CO2 29 11/16/2020    BUN 8 11/16/2020    CREATININE 0.81 11/16/2020    CALCIUM 8.9 11/16/2020    GLUCOSE 95 11/16/2020     Magnesium:No results found for: \"MG\"  Troponin:  No results found for: \"TROPHS\"  BNP: No results found for: \"BNP\"      Lipid Panel:  Lab Results   Component Value Date    HDL 51.4 10/14/2020    CHHDL 2.8 10/14/2020    VLDL 16 10/14/2020    TRIG 81 10/14/2020        Lab work and imaging results independently reviewed by me     Visit Vitals  /77   Pulse 78   Ht 1.753 m (5' 9\")   Wt 114 kg (252 lb)   BMI 37.21 kg/m²   Smoking Status Never   BSA 2.36 m²              Constitutional:       Appearance: Healthy appearance. Not in distress. Obese.   Eyes:      Conjunctiva/sclera: Conjunctivae normal.   Neck:      Vascular: JVD normal.   Pulmonary:      Effort: Pulmonary effort is normal.      Breath sounds: Normal breath sounds.   Cardiovascular:      PMI at left midclavicular line. Normal rate. Regular rhythm. Normal S1. Normal S2.       Murmurs: There is no murmur.      No rub.   Pulses:     Intact distal pulses.   Edema:     Peripheral edema absent.   Abdominal:      General: Bowel sounds are normal.   Musculoskeletal:      Cervical back: Neck supple. Skin:     General: Skin is warm and dry.   Neurological:      " Mental Status: Alert and oriented to person, place and time.           Problem List Items Addressed This Visit             ICD-10-CM    CAD in native artery I25.10    Relevant Medications    metoprolol succinate XL (Toprol-XL) 50 mg 24 hr tablet    History of heart attack I25.2    Hyperlipidemia E78.5    Hypertension - Primary I10    Presence of drug coated stent in coronary artery Z95.5         Mr. Hillman is doing well from a CV perspective without angina or symptoms suggestive of decompensated HF     BP is in acceptable range on current RX which He is tolerating well and agrees to continue   -- Toprol XL 50 mg     Most recent LDL 89 mg/dL with coronary artery stent  ---Atorvastatin 20 mg,  consider increasing,  goal LDL < 70 mg/dL   --ASA 81 mg daily     Mediterranean / DASH diet   150 minutes of symptom limited exercise / week         05/21/24 at 3:35 PM - JULIO C Montes-CNP      Orders:  No orders of the defined types were placed in this encounter.        Followup Appts:  No future appointments.

## 2024-05-29 PROBLEM — Z95.5 PRESENCE OF DRUG COATED STENT IN CORONARY ARTERY: Status: ACTIVE | Noted: 2024-05-29

## 2025-05-20 ENCOUNTER — APPOINTMENT (OUTPATIENT)
Dept: CARDIOLOGY | Facility: CLINIC | Age: 60
End: 2025-05-20
Payer: COMMERCIAL

## 2025-06-02 ENCOUNTER — APPOINTMENT (OUTPATIENT)
Dept: CARDIOLOGY | Facility: HOSPITAL | Age: 60
End: 2025-06-02
Payer: COMMERCIAL

## 2025-06-02 VITALS
OXYGEN SATURATION: 97 % | SYSTOLIC BLOOD PRESSURE: 123 MMHG | HEART RATE: 79 BPM | WEIGHT: 245.37 LBS | DIASTOLIC BLOOD PRESSURE: 65 MMHG | BODY MASS INDEX: 36.24 KG/M2

## 2025-06-02 DIAGNOSIS — I10 PRIMARY HYPERTENSION: ICD-10-CM

## 2025-06-02 LAB
ATRIAL RATE: 71 BPM
P AXIS: 39 DEGREES
P OFFSET: 192 MS
P ONSET: 135 MS
PR INTERVAL: 166 MS
Q ONSET: 218 MS
QRS COUNT: 12 BEATS
QRS DURATION: 90 MS
QT INTERVAL: 366 MS
QTC CALCULATION(BAZETT): 397 MS
QTC FREDERICIA: 387 MS
R AXIS: 47 DEGREES
T AXIS: 55 DEGREES
T OFFSET: 401 MS
VENTRICULAR RATE: 71 BPM

## 2025-06-02 PROCEDURE — 3078F DIAST BP <80 MM HG: CPT | Performed by: PHYSICIAN ASSISTANT

## 2025-06-02 PROCEDURE — 99214 OFFICE O/P EST MOD 30 MIN: CPT | Performed by: PHYSICIAN ASSISTANT

## 2025-06-02 PROCEDURE — 3074F SYST BP LT 130 MM HG: CPT | Performed by: PHYSICIAN ASSISTANT

## 2025-06-02 PROCEDURE — 93005 ELECTROCARDIOGRAM TRACING: CPT | Performed by: PHYSICIAN ASSISTANT

## 2025-06-02 NOTE — PROGRESS NOTES
Chief Complaint:   No chief complaint on file.     History Of Present Illness:    Talat Hillman is a 59 y.o. male presenting for routine follow-up.  Doing well overall.  Patient reports that he is fairly active able to complete all activities of daily living household chores without any chest pain or chest pressure.  Endorses occasional episodes of discomfort across the shoulders where his arms feel heavy.  This is nonexertional without associated symptoms no aggravating or alleviating factors.  Not consistent with his previous angina.  Today, Denies any chest pain, chest pressure, palpitations, dizziness, cough, shortness of breath, orthopnea, edema.       Last Recorded Vitals:  Vitals:    06/02/25 1539   BP: 123/65   Pulse: 79   SpO2: 97%   Weight: 111 kg (245 lb 6 oz)       Past Medical History:  He has no past medical history on file.    Past Surgical History:  He has a past surgical history that includes Other surgical history (10/14/2020); Other surgical history (10/14/2020); and Other surgical history (10/14/2020).      Social History:  He reports that he has never smoked. He has never used smokeless tobacco. He reports that he does not currently use alcohol. He reports current drug use. Drug: Marijuana.    Family History:  Family History[1]     Allergies:  Codeine    Outpatient Medications:  Current Outpatient Medications   Medication Instructions    amphetamine-dextroamphetamine XR (Adderall XR) 30 mg 24 hr capsule 30 mg, Daily before breakfast    ARIPiprazole (Abilify) 30 mg tablet 1 tablet, Daily    aspirin 81 mg EC tablet 1 tablet, Daily    atorvastatin (Lipitor) 20 mg tablet 1 tablet, Daily    desvenlafaxine 100 mg 24 hr tablet 2 tablets, Daily    levothyroxine (Synthroid, Levoxyl) 25 mcg tablet Take by mouth.    metoprolol succinate XL (Toprol-XL) 50 mg 24 hr tablet 1 tablet, Daily    ramipril (Altace) 5 mg capsule 1 capsule, Daily    tamsulosin (Flomax) 0.4 mg 24 hr capsule 1 capsule, Daily     tolterodine (Detrol) 2 mg tablet        Physical Exam:  Physical Exam  Constitutional:       General: He is not in acute distress.  HENT:      Head: Normocephalic.      Nose: Nose normal.      Mouth/Throat:      Mouth: Mucous membranes are moist.   Eyes:      Conjunctiva/sclera: Conjunctivae normal.   Neck:      Vascular: No carotid bruit.   Cardiovascular:      Rate and Rhythm: Normal rate and regular rhythm.      Pulses: Normal pulses.      Heart sounds: Normal heart sounds. No murmur heard.  Pulmonary:      Effort: Pulmonary effort is normal. No respiratory distress.      Breath sounds: Normal breath sounds.   Musculoskeletal:         General: No swelling.      Cervical back: Neck supple.   Skin:     General: Skin is warm.   Neurological:      General: No focal deficit present.      Mental Status: He is alert. Mental status is at baseline.   Psychiatric:         Mood and Affect: Mood normal.         Behavior: Behavior normal.            Last Labs:  CBC -  Lab Results   Component Value Date    WBC 7.3 11/16/2020    HGB 15.2 11/16/2020    HCT 45.0 11/16/2020    MCV 85 11/16/2020     11/16/2020       CMP -  Lab Results   Component Value Date    CALCIUM 8.9 11/16/2020    PROT 6.4 10/14/2020    ALBUMIN 4.1 10/14/2020    AST 11 10/14/2020    ALT 17 10/14/2020    ALKPHOS 84 10/14/2020    BILITOT 0.4 10/14/2020       LIPID PANEL -   Lab Results   Component Value Date    CHOL 146 10/14/2020    TRIG 81 10/14/2020    HDL 51.4 10/14/2020    CHHDL 2.8 10/14/2020    LDLF 78 10/14/2020    VLDL 16 10/14/2020       RENAL FUNCTION PANEL -   Lab Results   Component Value Date    GLUCOSE 95 11/16/2020     11/16/2020    K 4.0 11/16/2020     11/16/2020    CO2 29 11/16/2020    ANIONGAP 11 11/16/2020    BUN 8 11/16/2020    CREATININE 0.81 11/16/2020    CALCIUM 8.9 11/16/2020    ALBUMIN 4.1 10/14/2020        Lab Results   Component Value Date    HGBA1C 5.1 10/14/2020       Last Cardiology Tests:  ECG:  ECG 12 lead  (Clinic Performed) 06/02/2025 (Preliminary) independently reviewed, normal sinus rhythm without acute ischemic changes    Echo:  Transthoracic echocardiogram 6/7/2019  This result has an attachment that is not available.  · Left ventricular wall thickness is consistent with mild concentric  hypertrophy.  · Left ventricular systolic function is normal.  · There is calcification of the aortic valve.  · Calculated EF = 66%.    Left Ventricle  Left ventricular systolic function is normal. Calculated EF = 66%. Estimated EF was in agreement with the calculated EF. Normal left ventricular cavity size noted. Left ventricular wall thickness is consistent with mild concentric hypertrophy. Left ventricular diastolic function is normal.    Right Ventricle  Normal right ventricular cavity size and systolic function noted.    Left Atrium  Normal left atrial size noted.    Right Atrium  Normal right atrial size noted.    Mitral Valve  The mitral valve is grossly normal in structure. Trace mitral valve regurgitation is present. No significant mitral valve stenosis is present.    Tricuspid Valve  The tricuspid valve is normal. No evidence of tricuspid valve stenosis is present. Trace tricuspid valve regurgitation is present.    Aortic Valve  The valve appears trileaflet. The aortic valve is abnormal in structure. There is calcification of the aortic valve.No aortic valve regurgitation is present. No aortic valve stenosis is present.    Pulmonic Valve  The pulmonic valve is grossly normal in structure. There is no significant pulmonic valve stenosis present. There is no pulmonic valve regurgitation present.    Pericardium  There is no evidence of pericardial effusion.    Additional Study Details  A two-dimensional transthoracic echocardiogram with color flow and Doppler was performed. The study is technically good for diagnosis.    Greater Vessels  No dilation of the aortic root is present. The abdominal aorta is not well visualized  "on this study.  The inferior vena cava is normally sized. Normal IVC inspiratory collapse of greater than 50% noted.    Ejection Fractions:  No results found for: \"EF\"    Cath:  No results found for this or any previous visit from the past 1095 days.    Stress Test:  Treadmill stress 5/3/2019  This result has an attachment that is not available.  · No ECG evidence of myocardial ischemia.Negative clinical evidence of  myocardial ischemia. Findings consistent with a normal ECG stress test.    Stress Findings  No ECG evidence of myocardial ischemia.Negative clinical evidence of myocardial ischemia. Findings consistent with a normal ECG stress test.    Rest ECG  Baseline ECG rhythm of sinus tachycardia noted. Normal baseline ECG noted at rest.  SC interval = 160 ms. QRS complex = 80 ms. There was no ST segment deviation noted.    Stress ECG  Stress ECG of normal sinus rhythm noted. Normal ECG with no significant stress induced changes noted.  There was no ST segment deviation noted during stress.  There were no arrhythmias during stress.  There were no significant arrhythmias noted during the test.  Stress ECG was interpretable and indicates a normal stress ECG.  Normal ECG stress ECG interpretation.    Stress Description  A stress test was performed following the Arnaldo protocol.  The patient achieved the target heart rate. The patient requested the test to be stopped because the patient experienced fatigue and shortness of breath.  The patient reported shortness of breath during the stress test. The patient experienced no angina during the stress test.  The Duke Treadmill Score of 5 is consistent with a Low risk for ischemic heart disease.  Blood pressure demonstrated a normal response to stress. Heart rate demonstrated a normal response to stress. Overall, the patient's exercise capacity was mildly impaired.    Cardiac Imaging:  No results found for this or any previous visit from the past 1095 days.      Lab review: " "I have Chemistry CMP:   Lab Results   Component Value Date    ALBUMIN 4.1 10/14/2020    CALCIUM 8.9 11/16/2020    CO2 29 11/16/2020    CREATININE 0.81 11/16/2020    GLUCOSE 95 11/16/2020    BILITOT 0.4 10/14/2020    PROT 6.4 10/14/2020    ALT 17 10/14/2020    AST 11 10/14/2020    ALKPHOS 84 10/14/2020   , Chemistry BMP   Lab Results   Component Value Date    GLUCOSE 95 11/16/2020    CALCIUM 8.9 11/16/2020    CO2 29 11/16/2020    CREATININE 0.81 11/16/2020   , CBC:  Lab Results   Component Value Date    WBC 7.3 11/16/2020    RBC 5.30 11/16/2020    HGB 15.2 11/16/2020    HCT 45.0 11/16/2020    MCV 85 11/16/2020    MCHC 33.8 11/16/2020    RDW 14.5 11/16/2020   , Coags: No results found for: \"PTT\", \"APTT\", \"FFN\", \"FIBRINOGEN\", \"INR\", \"ACTIVATEDCL\", and Lipids:   Lab Results   Component Value Date    CHOL 146 10/14/2020    HDL 51.4 10/14/2020    TRIG 81 10/14/2020     Diagnostic review: I have personally reviewed the result(s) of the EKG, Echocardiogram, and stress .     The ASCVD Risk score (Bakari LEVI, et al., 2019) failed to calculate for the following reasons:    Risk score cannot be calculated because patient has a medical history suggesting prior/existing ASCVD    Assessment/Plan   Problem List Items Addressed This Visit           ICD-10-CM    Hypertension I10    Relevant Orders    ECG 12 lead (Clinic Performed) (Completed)      Talat Hillman is a 59-year-old male with a past medical history of coronary disease status post PCI in the setting of MI in 2003, hypertension, hyperlipidemia, hypothyroidism, overactive bladder, depression, anxiety, attention deficit disorder who presents for routine follow-up.  Doing well overall.    1.  Coronary artery disease status post PCI (2003)  2. HTN  3. Hyperlipidemia    Doing well overall.   Continue goal directed medical therapy for ASCVD including ASA 81mg daily, Atorvastatin 20mg daily.   C/w GDMT including Toprol 50mg daily, Ramipril 5mg every day  RTC 1 year or sooner if " needed.       Fifi Condon PA-C       [1] No family history on file.

## (undated) DEVICE — CANN NASL CO2 TRULINK W/O2 A/

## (undated) DEVICE — TUBING, SUCTION, 1/4" X 10', STRAIGHT: Brand: MEDLINE

## (undated) DEVICE — THE SINGLE USE ETRAP – POLYP TRAP IS USED FOR SUCTION RETRIEVAL OF ENDOSCOPICALLY REMOVED POLYPS.: Brand: ETRAP

## (undated) DEVICE — SNAR POLYP SENSATION STDOVL 27 240 BX40

## (undated) DEVICE — THE CARR-LOCKE INJECTION NEEDLE IS A SINGLE USE, DISPOSABLE, FLEXIBLE SHEATH INJECTION NEEDLE USED FOR THE INJECTION OF VARIOUS TYPES OF MEDIA THROUGH FLEXIBLE ENDOSCOPES.

## (undated) DEVICE — Device: Brand: DEFENDO AIR/WATER/SUCTION AND BIOPSY VALVE

## (undated) DEVICE — THE TORRENT IRRIGATION SCOPE CONNECTOR IS USED WITH THE TORRENT IRRIGATION TUBING TO PROVIDE IRRIGATION FLUIDS SUCH AS STERILE WATER DURING GASTROINTESTINAL ENDOSCOPIC PROCEDURES WHEN USED IN CONJUNCTION WITH AN IRRIGATION PUMP (OR ELECTROSURGICAL UNIT).: Brand: TORRENT